# Patient Record
Sex: MALE | Race: WHITE | Employment: OTHER | URBAN - METROPOLITAN AREA
[De-identification: names, ages, dates, MRNs, and addresses within clinical notes are randomized per-mention and may not be internally consistent; named-entity substitution may affect disease eponyms.]

---

## 2018-03-02 PROBLEM — M48.061 LUMBAR STENOSIS: Status: ACTIVE | Noted: 2018-03-02

## 2018-05-24 PROBLEM — M54.16 LUMBAR RADICULAR PAIN: Status: ACTIVE | Noted: 2018-05-24

## 2020-02-13 ENCOUNTER — HOSPITAL ENCOUNTER (OUTPATIENT)
Dept: CT IMAGING | Age: 75
Discharge: HOME | End: 2020-02-13
Attending: ORTHOPAEDIC SURGERY
Payer: MEDICARE

## 2020-02-13 ENCOUNTER — HOSPITAL ENCOUNTER (OUTPATIENT)
Dept: GENERAL RADIOLOGY | Age: 75
Discharge: HOME | End: 2020-02-13
Attending: RADIOLOGY | Admitting: RADIOLOGY
Payer: MEDICARE

## 2020-02-13 VITALS
RESPIRATION RATE: 18 BRPM | DIASTOLIC BLOOD PRESSURE: 52 MMHG | WEIGHT: 139.8 LBS | HEIGHT: 67 IN | SYSTOLIC BLOOD PRESSURE: 139 MMHG | BODY MASS INDEX: 21.94 KG/M2 | TEMPERATURE: 98.1 F | OXYGEN SATURATION: 99 % | HEART RATE: 77 BPM

## 2020-02-13 DIAGNOSIS — M51.36 DEGENERATION OF LUMBAR INTERVERTEBRAL DISC: ICD-10-CM

## 2020-02-13 LAB
GLUCOSE BLD STRIP.AUTO-MCNC: 122 MG/DL (ref 70–110)
GLUCOSE BLD STRIP.AUTO-MCNC: 208 MG/DL (ref 70–110)

## 2020-02-13 PROCEDURE — 62305 MYELOGRAPHY LUMBAR INJECTION: CPT

## 2020-02-13 PROCEDURE — 74011250637 HC RX REV CODE- 250/637: Performed by: PHYSICIAN ASSISTANT

## 2020-02-13 PROCEDURE — 82962 GLUCOSE BLOOD TEST: CPT

## 2020-02-13 PROCEDURE — 74011636320 HC RX REV CODE- 636/320: Performed by: RADIOLOGY

## 2020-02-13 PROCEDURE — 72132 CT LUMBAR SPINE W/DYE: CPT

## 2020-02-13 PROCEDURE — 72129 CT CHEST SPINE W/DYE: CPT

## 2020-02-13 RX ORDER — HYDROCODONE BITARTRATE AND ACETAMINOPHEN 5; 325 MG/1; MG/1
1-2 TABLET ORAL
Status: DISCONTINUED | OUTPATIENT
Start: 2020-02-13 | End: 2020-02-13 | Stop reason: HOSPADM

## 2020-02-13 RX ORDER — PREDNISONE 5 MG/1
5 TABLET ORAL
COMMUNITY
End: 2020-03-20

## 2020-02-13 RX ORDER — ACETAMINOPHEN 325 MG/1
650 TABLET ORAL
Status: DISCONTINUED | OUTPATIENT
Start: 2020-02-13 | End: 2020-02-13 | Stop reason: HOSPADM

## 2020-02-13 RX ORDER — LIDOCAINE HYDROCHLORIDE 10 MG/ML
5 INJECTION, SOLUTION EPIDURAL; INFILTRATION; INTRACAUDAL; PERINEURAL
Status: COMPLETED | OUTPATIENT
Start: 2020-02-13 | End: 2020-02-13

## 2020-02-13 RX ADMIN — LIDOCAINE HYDROCHLORIDE 5 ML: 10 INJECTION, SOLUTION EPIDURAL; INFILTRATION; INTRACAUDAL; PERINEURAL at 14:23

## 2020-02-13 RX ADMIN — ACETAMINOPHEN 650 MG: 325 TABLET, FILM COATED ORAL at 14:48

## 2020-02-13 RX ADMIN — IOPAMIDOL 10 ML: 408 INJECTION, SOLUTION INTRATHECAL at 14:23

## 2020-02-13 NOTE — PERIOP NOTES
Patient's blood glucose was 208 by accucheck. Patient refused any insulin coverage. Will continue to monitor.

## 2020-02-13 NOTE — PROGRESS NOTES
The patient had a thoracic and lumbar myelogram with Giacomo GUY  pager number [phone]. Pt has a bandaid on his low back and current pain level is a 4/10. This was verbally handed off to BOZENA/Pete Vigil in [facility].

## 2020-02-13 NOTE — DISCHARGE INSTRUCTIONS
Patient armband removed and given to patient to take home. Patient was informed of the privacy risks if armband lost or stolen    Patient Education        Myelogram: About This Test  What is it? A myelogram uses X-rays and a special dye to make pictures of bones and nerves of the spine (spinal canal). The spinal canal holds the spinal cord, the spinal nerve roots, and the fluid-filled space between the bones in your spine. Why is this test done? A myelogram is done to check for:  · The cause of arm or leg numbness, weakness, or pain. · Narrowing of the spinal canal (spinal stenosis). · A tumor or infection causing problems with the spinal cord or nerve roots. · A spinal disc that has ruptured (herniated disc). · Inflammation of the membrane that covers the brain and spinal cord. · Problems with the blood vessels to the spine. How can you prepare for the test?  Your doctor will tell you if you need to change how much you eat and drink before the myelogram. You may be asked to increase the amount of water you drink before the test. Follow the instructions your doctor gives you about eating and drinking. Before a myelogram, tell your doctor if:  · You are allergic to any medicines, contrast material, or iodine dye. · You have had bleeding problems, or you take a blood thinner, such as aspirin, clopidogrel (Plavix), or warfarin (Coumadin), or you take over-the-counter medicines, such as ibuprofen (Advil, Motrin) or naproxen (Aleve). Your doctor will tell you when you should stop taking these medicines several days before your procedure. Make sure that you understand exactly what he or she wants you to do. · You have had kidney problems. · You have diabetes, especially if you take metformin (Glucophage, for example). · You are or might be pregnant. Ask someone to take you home and stay with you after the test.  What happens during the test?  The dye is put into your spinal canal with a thin needle.  This is called a lumbar puncture. The dye moves through the space so the nerve roots and spinal cord can be seen more clearly. After the dye is put in, you will lie still while the X-ray pictures are taken. How does it feel? You will feel a quick sting from a small needle that has medicine to numb the skin on your back. You will also feel some pressure as the long, thin spinal needle is put into your spinal canal. You may feel a quick sharp pain down your buttock or leg when the needle is moved in your spine. The dye may make you feel warm and flushed and have a metallic taste in your mouth. What else should you know about the test?  In rare cases, the hole made by the needle in the sac around the spine does not close normally. This can allow spinal fluid to leak out. This leak may need to be repaired through a procedure called an epidural blood patch. To do the patch, your doctor injects some of your own blood to cover the hole. How long does the test take? · A myelogram usually takes 30 minutes to 1 hour. What happens after the test?  · You will probably be able to go home 30 minutes to 2 hours after the test.  · You may need to lie in bed with your head raised for 4 to 24 hours after the test. To prevent seizures, which are a rare side effect, do not bend over or lie down with your head lower than your body. Keeping your head higher than your body after a myelogram also may help prevent or reduce other side effects, such as headache, nausea, or vomiting. · Avoid strenuous activity, such as running or heavy lifting, for at least 1 day after your myelogram.  · Drink plenty of water after the myelogram. Your doctor will give you instructions on taking your regular medicines. When should you call for help? Call 911 anytime you think you may need emergency care. For example, call if:  · You have a seizure.   Call your doctor now or seek immediate medical care if:  · You have any increase in pain, weakness, or numbness in your legs. · You have a severe headache or stiff neck, or your eyes become very sensitive to light. · You have a headache that lasts longer than 24 hours. · You have problems urinating or having a bowel movement. · You have a fever. Follow-up care is a key part of your treatment and safety. Be sure to make and go to all appointments, and call your doctor if you are having problems. It's also a good idea to keep a list of the medicines you take. Ask your doctor when you can expect to have your test results. Where can you learn more? Go to http://fartun-danya.info/. Enter I888 in the search box to learn more about \"Myelogram: About This Test.\"  Current as of: March 28, 2019  Content Version: 12.2  © 6023-3354 [employer]. Care instructions adapted under license by [employer] (which disclaims liability or warranty for this information). If you have questions about a medical condition or this instruction, always ask your healthcare professional. [address] any warranty or liability for your use of this information.

## 2020-02-13 NOTE — PERIOP NOTES
Pre-Op Summary    Pt arrived via car with family/friend and is oriented to time, place, person and situation. Patient with steady gait with cane assistive devices. Visit Vitals  /73 (BP 1 Location: Right arm, BP Patient Position: Sitting)   Pulse 77   Temp 98.1 °F (36.7 °C)   Resp 18   Ht 5' 7\" (1.702 m)   Wt 63.4 kg (139 lb 12.8 oz)   SpO2 96%   BMI 21.90 kg/m²         Patients belongings are locked up on [facility]. Patient's point of contact is his daughter Malgorzata Valdes and their contact number [phone]. They will be leaving and coming back. Please call when in recovery and let her know what time discharge is, and she will be back to pick pt up. They are able to receive medication information. They will be their ride home.

## 2020-02-13 NOTE — PERIOP NOTES
Patient arrived to phase 2 via stretcher. FSBG 122    Pain 6/10. Requests pain med fore relief during recovery. Tylenol or Norco offered. ...patient requests Tylenol only. 650 mg Tylenol administered at 1450. Pillow placed under legs for comfort. Band Aid to lower back is intact with scant dot of drainage. Myelogram recovery instructions reviewed with patient. He verbalized understanding. Beverage and snack provided.

## 2020-02-13 NOTE — PROCEDURES
Interventional Radiology Brief Procedure Note    Performed By: Javier Thornton PA-C    Attending Radiologist: Lillie Bolton MD    Pre-operative Diagnosis:  Thoracic back pain & low back pain    Post-operative Diagnosis: Same as pre-op diagnosis    Procedure(s) Performed:  Thoracic & Lumbar Myelogram    Anesthesia:  Local Anesthesia    Findings:  The patient's low back was prepped in the usual sterile manner. 1% Lidocaine was used for local anesthesia. A 22 G spinal needle was advanced into spinal subarachnoid space via a midline approach at L4.  15 cc of Isovue 200M was instilled into dural sac. Images and subsequent CT scan to follow. Complications: None immediate    Estimated Blood Loss:  Minimal    Tubes and Drains: None    Specimens: None    Condition: Good    Disposition:  Observation x 4 hours, then D/C home.     Javier Thornton PA-C  [facility]  Vascular & Interventional Radiology  2/13/2020

## 2020-03-20 ENCOUNTER — HOSPITAL ENCOUNTER (OUTPATIENT)
Dept: PREADMISSION TESTING | Age: 75
Discharge: HOME | End: 2020-03-20
Payer: MEDICARE

## 2020-03-20 ENCOUNTER — HOSPITAL ENCOUNTER (OUTPATIENT)
Dept: GENERAL RADIOLOGY | Age: 75
Discharge: HOME | End: 2020-03-20
Attending: ORTHOPAEDIC SURGERY
Payer: MEDICARE

## 2020-03-20 ENCOUNTER — HOSPITAL ENCOUNTER (OUTPATIENT)
Dept: LAB | Age: 75
Discharge: HOME | End: 2020-03-20
Payer: MEDICARE

## 2020-03-20 DIAGNOSIS — Z01.818 PRE-OP TESTING: ICD-10-CM

## 2020-03-20 DIAGNOSIS — M43.16 SPONDYLOLISTHESIS OF LUMBAR REGION: ICD-10-CM

## 2020-03-20 LAB
ALBUMIN SERPL-MCNC: 3.2 G/DL (ref 3.4–5)
ALBUMIN/GLOB SERPL: 1 {RATIO} (ref 0.8–1.7)
ALP SERPL-CCNC: 106 U/L (ref 45–117)
ALT SERPL-CCNC: 27 U/L (ref 16–61)
ANION GAP SERPL CALC-SCNC: 4 MMOL/L (ref 3–18)
APTT PPP: 25.4 SEC (ref 23–36.4)
AST SERPL-CCNC: 22 U/L (ref 10–38)
BILIRUB SERPL-MCNC: 0.4 MG/DL (ref 0.2–1)
BUN SERPL-MCNC: 16 MG/DL (ref 7–18)
BUN/CREAT SERPL: 19 (ref 12–20)
CALCIUM SERPL-MCNC: 9 MG/DL (ref 8.5–10.1)
CHLORIDE SERPL-SCNC: 104 MMOL/L (ref 100–111)
CO2 SERPL-SCNC: 31 MMOL/L (ref 21–32)
CREAT SERPL-MCNC: 0.85 MG/DL (ref 0.6–1.3)
ERYTHROCYTE [DISTWIDTH] IN BLOOD BY AUTOMATED COUNT: 13.1 % (ref 11.6–14.5)
GLOBULIN SER CALC-MCNC: 3.1 G/DL (ref 2–4)
GLUCOSE SERPL-MCNC: 207 MG/DL (ref 74–99)
HCT VFR BLD AUTO: 33.9 % (ref 36–48)
HGB BLD-MCNC: 11.1 G/DL (ref 13–16)
INR PPP: 1.1 (ref 0.8–1.2)
MCH RBC QN AUTO: 29.9 PG (ref 24–34)
MCHC RBC AUTO-ENTMCNC: 32.7 G/DL (ref 31–37)
MCV RBC AUTO: 91.4 FL (ref 74–97)
PLATELET # BLD AUTO: 283 K/UL (ref 135–420)
PMV BLD AUTO: 10.3 FL (ref 9.2–11.8)
POTASSIUM SERPL-SCNC: 4.1 MMOL/L (ref 3.5–5.5)
PROT SERPL-MCNC: 6.3 G/DL (ref 6.4–8.2)
PROTHROMBIN TIME: 14.2 SEC (ref 11.5–15.2)
RBC # BLD AUTO: 3.71 M/UL (ref 4.7–5.5)
SODIUM SERPL-SCNC: 139 MMOL/L (ref 136–145)
WBC # BLD AUTO: 5 K/UL (ref 4.6–13.2)

## 2020-03-20 PROCEDURE — 85730 THROMBOPLASTIN TIME PARTIAL: CPT

## 2020-03-20 PROCEDURE — 93005 ELECTROCARDIOGRAM TRACING: CPT

## 2020-03-20 PROCEDURE — 85027 COMPLETE CBC AUTOMATED: CPT

## 2020-03-20 PROCEDURE — 36415 COLL VENOUS BLD VENIPUNCTURE: CPT

## 2020-03-20 PROCEDURE — 85610 PROTHROMBIN TIME: CPT

## 2020-03-20 PROCEDURE — 71046 X-RAY EXAM CHEST 2 VIEWS: CPT

## 2020-03-20 PROCEDURE — 80053 COMPREHEN METABOLIC PANEL: CPT

## 2020-03-20 RX ORDER — PANTOPRAZOLE SODIUM 40 MG/1
40 TABLET, DELAYED RELEASE ORAL DAILY
COMMUNITY

## 2020-03-20 RX ORDER — GLIMEPIRIDE 1 MG/1
1 TABLET ORAL 2 TIMES DAILY
COMMUNITY

## 2020-03-20 NOTE — PERIOP NOTES
PAT - SURGICAL PRE-ADMISSION INSTRUCTIONS    NAME:  Mio Bean                                                          TODAY'S DATE:  3/20/2020    SURGERY DATE:  4/16/2020                                  SURGERY ARRIVAL TIME:   TBA    1. Do NOT eat or drink anything, including candy or gum, after MIDNIGHT on 04/15 , unless you have specific instructions from your Surgeon or Anesthesia Provider to do so. 2. No smoking on the day of surgery. 3. No alcohol 24 hours prior to the day of surgery. 4. No recreational drugs for one week prior to the day of surgery. 5. Leave all valuables, including money/purse, at home. 6. Remove all jewelry, nail polish, makeup (including mascara); no lotions, powders, deodorant, or perfume/cologne/after shave. 7. Glasses/Contact lenses and Dentures may be worn to the hospital.  They will be removed prior to surgery. 8. Call your doctor if symptoms of a cold or illness develop within 24 ours prior to surgery. 9. AN ADULT MUST DRIVE YOU HOME AFTER OUTPATIENT SURGERY. 10. If you are having an OUTPATIENT procedure, please make arrangements for a responsible adult to be with you for 24 hours after your surgery. 11. If you are admitted to the hospital, you will be assigned to a bed after surgery is complete. Normally a family member will not be able to see you until you are in your assigned bed. 15. Family is encouraged to accompany you to the hospital.  We ask visitors in the treatment area to be limited to ONE person at a time to ensure patient privacy. EXCEPTIONS WILL BE MADE AS NEEDED. 15. Children under 12 are discouraged from entering the treatment area and need to be supervised by an adult when in the waiting room. Special Instructions: Take these medications the morning of surgery with a sip of water:  NONE, HOLD oral diabetic medication on the MORNING OF surgery.     Patient Prep:    use CHG solution    These surgical instructions were reviewed with Aguilar Heredia during the PAT visit. Directions: On the morning of surgery, please go to the [address] [facility]. Enter the building from the [facility] entrance, 1st floor (next to the Emergency Room entrance). Take the elevator to the 2nd floor. Sign in at the Registration Desk.     If you have any questions and/or concerns, please do not hesitate to call:  (Prior to the day of surgery)  [facility] unit:  [phone]  (Day of surgery)  [facility] unit:  [phone]

## 2020-03-21 LAB
ATRIAL RATE: 101 BPM
CALCULATED P AXIS, ECG09: 81 DEGREES
CALCULATED R AXIS, ECG10: 81 DEGREES
CALCULATED T AXIS, ECG11: 49 DEGREES
DIAGNOSIS, 93000: NORMAL
P-R INTERVAL, ECG05: 150 MS
Q-T INTERVAL, ECG07: 366 MS
QRS DURATION, ECG06: 120 MS
QTC CALCULATION (BEZET), ECG08: 474 MS
VENTRICULAR RATE, ECG03: 101 BPM

## 2020-04-15 ENCOUNTER — ANESTHESIA EVENT (OUTPATIENT)
Dept: SURGERY | Age: 75
DRG: 456 | End: 2020-04-15
Payer: MEDICARE

## 2020-04-15 NOTE — PERIOP NOTES
Patient was called and notified of new surgery time of 0730. Notifed to be here at 0530 tomm morning. Patient stated that he understood, and no questions at this time.

## 2020-04-16 ENCOUNTER — APPOINTMENT (OUTPATIENT)
Dept: GENERAL RADIOLOGY | Age: 75
DRG: 456 | End: 2020-04-16
Attending: ORTHOPAEDIC SURGERY
Payer: MEDICARE

## 2020-04-16 ENCOUNTER — HOSPITAL ENCOUNTER (INPATIENT)
Age: 75
LOS: 5 days | Discharge: NURSING, REHAB OR LONG-TERM CARE | DRG: 456 | End: 2020-04-21
Attending: ORTHOPAEDIC SURGERY | Admitting: ORTHOPAEDIC SURGERY
Payer: MEDICARE

## 2020-04-16 ENCOUNTER — ANESTHESIA (OUTPATIENT)
Dept: SURGERY | Age: 75
DRG: 456 | End: 2020-04-16
Payer: MEDICARE

## 2020-04-16 DIAGNOSIS — Z98.1 S/P LUMBAR FUSION: Primary | ICD-10-CM

## 2020-04-16 LAB
ANION GAP SERPL CALC-SCNC: 9 MMOL/L (ref 3–18)
BASOPHILS # BLD: 0 K/UL (ref 0–0.1)
BASOPHILS NFR BLD: 0 % (ref 0–2)
BUN SERPL-MCNC: 10 MG/DL (ref 7–18)
BUN/CREAT SERPL: 13 (ref 12–20)
CALCIUM SERPL-MCNC: 7.6 MG/DL (ref 8.5–10.1)
CHLORIDE SERPL-SCNC: 109 MMOL/L (ref 100–111)
CO2 SERPL-SCNC: 23 MMOL/L (ref 21–32)
CREAT SERPL-MCNC: 0.75 MG/DL (ref 0.6–1.3)
DIFFERENTIAL METHOD BLD: ABNORMAL
EOSINOPHIL # BLD: 0 K/UL (ref 0–0.4)
EOSINOPHIL NFR BLD: 0 % (ref 0–5)
ERYTHROCYTE [DISTWIDTH] IN BLOOD BY AUTOMATED COUNT: 13.7 % (ref 11.6–14.5)
GLUCOSE BLD STRIP.AUTO-MCNC: 118 MG/DL (ref 70–110)
GLUCOSE BLD STRIP.AUTO-MCNC: 193 MG/DL (ref 70–110)
GLUCOSE BLD STRIP.AUTO-MCNC: 220 MG/DL (ref 70–110)
GLUCOSE BLD STRIP.AUTO-MCNC: 234 MG/DL (ref 70–110)
GLUCOSE BLD STRIP.AUTO-MCNC: 245 MG/DL (ref 70–110)
GLUCOSE SERPL-MCNC: 225 MG/DL (ref 74–99)
HBA1C MFR BLD: 6.2 % (ref 4.2–5.6)
HCT VFR BLD AUTO: 27.9 % (ref 36–48)
HCT VFR BLD AUTO: 30.5 % (ref 36–48)
HGB BLD-MCNC: 9.2 G/DL (ref 13–16)
HGB BLD-MCNC: 9.9 G/DL (ref 13–16)
LYMPHOCYTES # BLD: 0.4 K/UL (ref 0.9–3.6)
LYMPHOCYTES NFR BLD: 4 % (ref 21–52)
MCH RBC QN AUTO: 30 PG (ref 24–34)
MCHC RBC AUTO-ENTMCNC: 32.5 G/DL (ref 31–37)
MCV RBC AUTO: 92.4 FL (ref 74–97)
MONOCYTES # BLD: 0.3 K/UL (ref 0.05–1.2)
MONOCYTES NFR BLD: 3 % (ref 3–10)
NEUTS SEG # BLD: 9.1 K/UL (ref 1.8–8)
NEUTS SEG NFR BLD: 93 % (ref 40–73)
PLATELET # BLD AUTO: 217 K/UL (ref 135–420)
PMV BLD AUTO: 10.3 FL (ref 9.2–11.8)
POTASSIUM SERPL-SCNC: 4.2 MMOL/L (ref 3.5–5.5)
RBC # BLD AUTO: 3.3 M/UL (ref 4.7–5.5)
SODIUM SERPL-SCNC: 141 MMOL/L (ref 136–145)
TSH SERPL DL<=0.05 MIU/L-ACNC: 1.03 UIU/ML (ref 0.36–3.74)
WBC # BLD AUTO: 9.8 K/UL (ref 4.6–13.2)

## 2020-04-16 PROCEDURE — 74011250636 HC RX REV CODE- 250/636: Performed by: NURSE ANESTHETIST, CERTIFIED REGISTERED

## 2020-04-16 PROCEDURE — 0QH204Z INSERTION OF INTERNAL FIXATION DEVICE INTO RIGHT PELVIC BONE, OPEN APPROACH: ICD-10-PCS | Performed by: ORTHOPAEDIC SURGERY

## 2020-04-16 PROCEDURE — 77030018673: Performed by: ORTHOPAEDIC SURGERY

## 2020-04-16 PROCEDURE — 77030036944: Performed by: ORTHOPAEDIC SURGERY

## 2020-04-16 PROCEDURE — 77030011264 HC ELECTRD BLD EXT COVD -A: Performed by: ORTHOPAEDIC SURGERY

## 2020-04-16 PROCEDURE — 0RG7071 FUSION OF 2 TO 7 THORACIC VERTEBRAL JOINTS WITH AUTOLOGOUS TISSUE SUBSTITUTE, POSTERIOR APPROACH, POSTERIOR COLUMN, OPEN APPROACH: ICD-10-PCS | Performed by: ORTHOPAEDIC SURGERY

## 2020-04-16 PROCEDURE — 0RGA071 FUSION OF THORACOLUMBAR VERTEBRAL JOINT WITH AUTOLOGOUS TISSUE SUBSTITUTE, POSTERIOR APPROACH, POSTERIOR COLUMN, OPEN APPROACH: ICD-10-PCS | Performed by: ORTHOPAEDIC SURGERY

## 2020-04-16 PROCEDURE — 0SG3071 FUSION OF LUMBOSACRAL JOINT WITH AUTOLOGOUS TISSUE SUBSTITUTE, POSTERIOR APPROACH, POSTERIOR COLUMN, OPEN APPROACH: ICD-10-PCS | Performed by: ORTHOPAEDIC SURGERY

## 2020-04-16 PROCEDURE — 77030034403 HC GRFT DBM PTTY OSTEOSLCT 10CC SYR BACT -G: Performed by: ORTHOPAEDIC SURGERY

## 2020-04-16 PROCEDURE — 74011000272 HC RX REV CODE- 272: Performed by: ORTHOPAEDIC SURGERY

## 2020-04-16 PROCEDURE — 74011000250 HC RX REV CODE- 250: Performed by: ORTHOPAEDIC SURGERY

## 2020-04-16 PROCEDURE — C1713 ANCHOR/SCREW BN/BN,TIS/BN: HCPCS | Performed by: ORTHOPAEDIC SURGERY

## 2020-04-16 PROCEDURE — 0Q800ZZ DIVISION OF LUMBAR VERTEBRA, OPEN APPROACH: ICD-10-PCS | Performed by: ORTHOPAEDIC SURGERY

## 2020-04-16 PROCEDURE — 86900 BLOOD TYPING SEROLOGIC ABO: CPT

## 2020-04-16 PROCEDURE — 77030040356 HC CORD BPLR FRCP COVD -A: Performed by: ORTHOPAEDIC SURGERY

## 2020-04-16 PROCEDURE — 77030020269 HC MISC IMPL: Performed by: ORTHOPAEDIC SURGERY

## 2020-04-16 PROCEDURE — 77030018723 HC ELCTRD BLD COVD -A: Performed by: ORTHOPAEDIC SURGERY

## 2020-04-16 PROCEDURE — 0SG1071 FUSION OF 2 OR MORE LUMBAR VERTEBRAL JOINTS WITH AUTOLOGOUS TISSUE SUBSTITUTE, POSTERIOR APPROACH, POSTERIOR COLUMN, OPEN APPROACH: ICD-10-PCS | Performed by: ORTHOPAEDIC SURGERY

## 2020-04-16 PROCEDURE — 74011250637 HC RX REV CODE- 250/637: Performed by: INTERNAL MEDICINE

## 2020-04-16 PROCEDURE — 74011250636 HC RX REV CODE- 250/636: Performed by: ORTHOPAEDIC SURGERY

## 2020-04-16 PROCEDURE — 85025 COMPLETE CBC W/AUTO DIFF WBC: CPT

## 2020-04-16 PROCEDURE — 77030004391 HC BUR FLUT MEDT -C: Performed by: ORTHOPAEDIC SURGERY

## 2020-04-16 PROCEDURE — 76010000183 HC OR TIME 8 TO 8.5 HR INTENSV-TIER 1: Performed by: ORTHOPAEDIC SURGERY

## 2020-04-16 PROCEDURE — 83036 HEMOGLOBIN GLYCOSYLATED A1C: CPT

## 2020-04-16 PROCEDURE — 77030028270 HC SRGFL HEMSTAT MTRX J&J -C: Performed by: ORTHOPAEDIC SURGERY

## 2020-04-16 PROCEDURE — 77030018836 HC SOL IRR NACL ICUM -A: Performed by: ORTHOPAEDIC SURGERY

## 2020-04-16 PROCEDURE — 77030034850: Performed by: ORTHOPAEDIC SURGERY

## 2020-04-16 PROCEDURE — 77030040413: Performed by: ORTHOPAEDIC SURGERY

## 2020-04-16 PROCEDURE — 74011000250 HC RX REV CODE- 250: Performed by: NURSE ANESTHETIST, CERTIFIED REGISTERED

## 2020-04-16 PROCEDURE — 74011636637 HC RX REV CODE- 636/637: Performed by: INTERNAL MEDICINE

## 2020-04-16 PROCEDURE — 65270000029 HC RM PRIVATE

## 2020-04-16 PROCEDURE — 77030031139 HC SUT VCRL2 J&J -A: Performed by: ORTHOPAEDIC SURGERY

## 2020-04-16 PROCEDURE — 77030003196 HC GRFT RECOMB BN MEDT -K1: Performed by: ORTHOPAEDIC SURGERY

## 2020-04-16 PROCEDURE — 77030013079 HC BLNKT BAIR HGGR 3M -A: Performed by: NURSE ANESTHETIST, CERTIFIED REGISTERED

## 2020-04-16 PROCEDURE — 77030031359 HC BLD BN MILL DISP STRY -E: Performed by: ORTHOPAEDIC SURGERY

## 2020-04-16 PROCEDURE — 3E0U0GB INTRODUCTION OF RECOMBINANT BONE MORPHOGENETIC PROTEIN INTO JOINTS, OPEN APPROACH: ICD-10-PCS | Performed by: ORTHOPAEDIC SURGERY

## 2020-04-16 PROCEDURE — 74011250636 HC RX REV CODE- 250/636

## 2020-04-16 PROCEDURE — 85018 HEMOGLOBIN: CPT

## 2020-04-16 PROCEDURE — 77030008683 HC TU ET CUF COVD -A: Performed by: NURSE ANESTHETIST, CERTIFIED REGISTERED

## 2020-04-16 PROCEDURE — 77030037087 HC DEV ART BMC PRP CLNG -H: Performed by: ORTHOPAEDIC SURGERY

## 2020-04-16 PROCEDURE — 84443 ASSAY THYROID STIM HORMONE: CPT

## 2020-04-16 PROCEDURE — 0SP004Z REMOVAL OF INTERNAL FIXATION DEVICE FROM LUMBAR VERTEBRAL JOINT, OPEN APPROACH: ICD-10-PCS | Performed by: ORTHOPAEDIC SURGERY

## 2020-04-16 PROCEDURE — 72100 X-RAY EXAM L-S SPINE 2/3 VWS: CPT

## 2020-04-16 PROCEDURE — 77030021678 HC GLIDESCP STAT DISP VERT -B: Performed by: NURSE ANESTHETIST, CERTIFIED REGISTERED

## 2020-04-16 PROCEDURE — 74011636637 HC RX REV CODE- 636/637: Performed by: NURSE ANESTHETIST, CERTIFIED REGISTERED

## 2020-04-16 PROCEDURE — 77030003028 HC SUT VCRL J&J -A: Performed by: ORTHOPAEDIC SURGERY

## 2020-04-16 PROCEDURE — 82962 GLUCOSE BLOOD TEST: CPT

## 2020-04-16 PROCEDURE — 76210000001 HC OR PH I REC 2.5 TO 3 HR: Performed by: ORTHOPAEDIC SURGERY

## 2020-04-16 PROCEDURE — 74011000258 HC RX REV CODE- 258: Performed by: NURSE ANESTHETIST, CERTIFIED REGISTERED

## 2020-04-16 PROCEDURE — 76060000047 HC ANESTHESIA 8 TO 8.5 HR: Performed by: ORTHOPAEDIC SURGERY

## 2020-04-16 PROCEDURE — 0QH304Z INSERTION OF INTERNAL FIXATION DEVICE INTO LEFT PELVIC BONE, OPEN APPROACH: ICD-10-PCS | Performed by: ORTHOPAEDIC SURGERY

## 2020-04-16 PROCEDURE — 86920 COMPATIBILITY TEST SPIN: CPT

## 2020-04-16 PROCEDURE — 77030036946 HC GRFT BN FBR CORT 3DEMIN 10CC BACT -G: Performed by: ORTHOPAEDIC SURGERY

## 2020-04-16 PROCEDURE — 01NB0ZZ RELEASE LUMBAR NERVE, OPEN APPROACH: ICD-10-PCS | Performed by: ORTHOPAEDIC SURGERY

## 2020-04-16 PROCEDURE — 77030032490 HC SLV COMPR SCD KNE COVD -B: Performed by: ORTHOPAEDIC SURGERY

## 2020-04-16 PROCEDURE — 74011250636 HC RX REV CODE- 250/636: Performed by: INTERNAL MEDICINE

## 2020-04-16 PROCEDURE — 74011250637 HC RX REV CODE- 250/637: Performed by: NURSE ANESTHETIST, CERTIFIED REGISTERED

## 2020-04-16 PROCEDURE — 80048 BASIC METABOLIC PNL TOTAL CA: CPT

## 2020-04-16 DEVICE — SCREW POLYAX ASMBLY 6.5MMX45MM: Type: IMPLANTABLE DEVICE | Site: SPINE LUMBAR | Status: FUNCTIONAL

## 2020-04-16 DEVICE — IMPLANTABLE DEVICE: Type: IMPLANTABLE DEVICE | Site: SPINE LUMBAR | Status: FUNCTIONAL

## 2020-04-16 DEVICE — SCREW SPNL POST THORACOLUMBOSACRAL LCK CLOSE TULIP RELINE: Type: IMPLANTABLE DEVICE | Site: SPINE LUMBAR | Status: FUNCTIONAL

## 2020-04-16 DEVICE — SCREW POLYAX ASMBLY 8.5MMX80MM: Type: IMPLANTABLE DEVICE | Site: SPINE LUMBAR | Status: FUNCTIONAL

## 2020-04-16 DEVICE — SCREW SPNL DIA5.5MM OPN TULIP LOK RELINE: Type: IMPLANTABLE DEVICE | Site: SPINE LUMBAR | Status: FUNCTIONAL

## 2020-04-16 DEVICE — BONE GRAFT KIT 7510800 INFUSE LARGE II
Type: IMPLANTABLE DEVICE | Site: SPINE LUMBAR | Status: FUNCTIONAL
Brand: INFUSE® BONE GRAFT

## 2020-04-16 DEVICE — SCREW SPNL L50MM OD65MM POLYAX ASSEMB CANAVERAL: Type: IMPLANTABLE DEVICE | Site: SPINE LUMBAR | Status: FUNCTIONAL

## 2020-04-16 DEVICE — SCREW SPNL L40MM OD6.5MM POLYAX ASMBLY CANAVERAL: Type: IMPLANTABLE DEVICE | Site: SPINE LUMBAR | Status: FUNCTIONAL

## 2020-04-16 RX ORDER — NALOXONE HYDROCHLORIDE 0.4 MG/ML
INJECTION, SOLUTION INTRAMUSCULAR; INTRAVENOUS; SUBCUTANEOUS
Status: COMPLETED
Start: 2020-04-16 | End: 2020-04-16

## 2020-04-16 RX ORDER — INSULIN LISPRO 100 [IU]/ML
INJECTION, SOLUTION INTRAVENOUS; SUBCUTANEOUS
Status: DISCONTINUED | OUTPATIENT
Start: 2020-04-17 | End: 2020-04-16

## 2020-04-16 RX ORDER — LABETALOL HYDROCHLORIDE 5 MG/ML
5 INJECTION, SOLUTION INTRAVENOUS AS NEEDED
Status: DISCONTINUED | OUTPATIENT
Start: 2020-04-16 | End: 2020-04-16

## 2020-04-16 RX ORDER — SODIUM CHLORIDE 9 MG/ML
250 INJECTION, SOLUTION INTRAVENOUS AS NEEDED
Status: DISCONTINUED | OUTPATIENT
Start: 2020-04-16 | End: 2020-04-18

## 2020-04-16 RX ORDER — DIPHENHYDRAMINE HYDROCHLORIDE 50 MG/ML
12.5 INJECTION, SOLUTION INTRAMUSCULAR; INTRAVENOUS
Status: DISCONTINUED | OUTPATIENT
Start: 2020-04-16 | End: 2020-04-16

## 2020-04-16 RX ORDER — OXYCODONE AND ACETAMINOPHEN 10; 325 MG/1; MG/1
2 TABLET ORAL
Status: DISCONTINUED | OUTPATIENT
Start: 2020-04-16 | End: 2020-04-16

## 2020-04-16 RX ORDER — MAGNESIUM SULFATE 100 %
4 CRYSTALS MISCELLANEOUS AS NEEDED
Status: DISCONTINUED | OUTPATIENT
Start: 2020-04-16 | End: 2020-04-21 | Stop reason: HOSPADM

## 2020-04-16 RX ORDER — INSULIN LISPRO 100 [IU]/ML
INJECTION, SOLUTION INTRAVENOUS; SUBCUTANEOUS ONCE
Status: COMPLETED | OUTPATIENT
Start: 2020-04-16 | End: 2020-04-16

## 2020-04-16 RX ORDER — SUCCINYLCHOLINE CHLORIDE 20 MG/ML
INJECTION INTRAMUSCULAR; INTRAVENOUS AS NEEDED
Status: DISCONTINUED | OUTPATIENT
Start: 2020-04-16 | End: 2020-04-16 | Stop reason: HOSPADM

## 2020-04-16 RX ORDER — DEXAMETHASONE SODIUM PHOSPHATE 4 MG/ML
INJECTION, SOLUTION INTRA-ARTICULAR; INTRALESIONAL; INTRAMUSCULAR; INTRAVENOUS; SOFT TISSUE AS NEEDED
Status: DISCONTINUED | OUTPATIENT
Start: 2020-04-16 | End: 2020-04-16 | Stop reason: HOSPADM

## 2020-04-16 RX ORDER — SODIUM CHLORIDE 0.9 % (FLUSH) 0.9 %
5-40 SYRINGE (ML) INJECTION EVERY 8 HOURS
Status: DISCONTINUED | OUTPATIENT
Start: 2020-04-16 | End: 2020-04-21 | Stop reason: HOSPADM

## 2020-04-16 RX ORDER — SODIUM CHLORIDE, SODIUM LACTATE, POTASSIUM CHLORIDE, CALCIUM CHLORIDE 600; 310; 30; 20 MG/100ML; MG/100ML; MG/100ML; MG/100ML
25 INJECTION, SOLUTION INTRAVENOUS CONTINUOUS
Status: DISCONTINUED | OUTPATIENT
Start: 2020-04-16 | End: 2020-04-16 | Stop reason: HOSPADM

## 2020-04-16 RX ORDER — SODIUM CHLORIDE AND POTASSIUM CHLORIDE .9; .15 G/100ML; G/100ML
1000 SOLUTION INTRAVENOUS CONTINUOUS
Status: DISCONTINUED | OUTPATIENT
Start: 2020-04-16 | End: 2020-04-16

## 2020-04-16 RX ORDER — PHENYLEPHRINE HCL IN 0.9% NACL 30MG/250ML
PLASTIC BAG, INJECTION (ML) INTRAVENOUS
Status: DISCONTINUED | OUTPATIENT
Start: 2020-04-16 | End: 2020-04-16 | Stop reason: HOSPADM

## 2020-04-16 RX ORDER — SODIUM CHLORIDE AND POTASSIUM CHLORIDE .9; .15 G/100ML; G/100ML
SOLUTION INTRAVENOUS CONTINUOUS
Status: DISCONTINUED | OUTPATIENT
Start: 2020-04-16 | End: 2020-04-20

## 2020-04-16 RX ORDER — CEFAZOLIN SODIUM 2 G/50ML
2 SOLUTION INTRAVENOUS EVERY 8 HOURS
Status: DISCONTINUED | OUTPATIENT
Start: 2020-04-16 | End: 2020-04-17

## 2020-04-16 RX ORDER — NALOXONE HYDROCHLORIDE 0.4 MG/ML
0.04 INJECTION, SOLUTION INTRAMUSCULAR; INTRAVENOUS; SUBCUTANEOUS AS NEEDED
Status: DISCONTINUED | OUTPATIENT
Start: 2020-04-16 | End: 2020-04-16

## 2020-04-16 RX ORDER — HYDROMORPHONE HYDROCHLORIDE 1 MG/ML
1 INJECTION, SOLUTION INTRAMUSCULAR; INTRAVENOUS; SUBCUTANEOUS
Status: DISCONTINUED | OUTPATIENT
Start: 2020-04-16 | End: 2020-04-21 | Stop reason: HOSPADM

## 2020-04-16 RX ORDER — FENTANYL CITRATE 50 UG/ML
INJECTION, SOLUTION INTRAMUSCULAR; INTRAVENOUS AS NEEDED
Status: DISCONTINUED | OUTPATIENT
Start: 2020-04-16 | End: 2020-04-16 | Stop reason: HOSPADM

## 2020-04-16 RX ORDER — SODIUM CHLORIDE, SODIUM LACTATE, POTASSIUM CHLORIDE, CALCIUM CHLORIDE 600; 310; 30; 20 MG/100ML; MG/100ML; MG/100ML; MG/100ML
50 INJECTION, SOLUTION INTRAVENOUS CONTINUOUS
Status: DISCONTINUED | OUTPATIENT
Start: 2020-04-16 | End: 2020-04-16

## 2020-04-16 RX ORDER — INSULIN LISPRO 100 [IU]/ML
INJECTION, SOLUTION INTRAVENOUS; SUBCUTANEOUS ONCE
Status: DISCONTINUED | OUTPATIENT
Start: 2020-04-16 | End: 2020-04-16 | Stop reason: HOSPADM

## 2020-04-16 RX ORDER — VANCOMYCIN HYDROCHLORIDE 1 G/20ML
INJECTION, POWDER, LYOPHILIZED, FOR SOLUTION INTRAVENOUS AS NEEDED
Status: DISCONTINUED | OUTPATIENT
Start: 2020-04-16 | End: 2020-04-16 | Stop reason: HOSPADM

## 2020-04-16 RX ORDER — INSULIN LISPRO 100 [IU]/ML
4 INJECTION, SOLUTION INTRAVENOUS; SUBCUTANEOUS ONCE
Status: COMPLETED | OUTPATIENT
Start: 2020-04-16 | End: 2020-04-16

## 2020-04-16 RX ORDER — PROPOFOL 10 MG/ML
INJECTION, EMULSION INTRAVENOUS AS NEEDED
Status: DISCONTINUED | OUTPATIENT
Start: 2020-04-16 | End: 2020-04-16 | Stop reason: HOSPADM

## 2020-04-16 RX ORDER — HYDROMORPHONE HYDROCHLORIDE 1 MG/ML
INJECTION, SOLUTION INTRAMUSCULAR; INTRAVENOUS; SUBCUTANEOUS AS NEEDED
Status: DISCONTINUED | OUTPATIENT
Start: 2020-04-16 | End: 2020-04-16 | Stop reason: HOSPADM

## 2020-04-16 RX ORDER — OXYCODONE AND ACETAMINOPHEN 10; 325 MG/1; MG/1
1-2 TABLET ORAL
Status: DISCONTINUED | OUTPATIENT
Start: 2020-04-16 | End: 2020-04-21 | Stop reason: HOSPADM

## 2020-04-16 RX ORDER — HYDRALAZINE HYDROCHLORIDE 20 MG/ML
20 INJECTION INTRAMUSCULAR; INTRAVENOUS
Status: DISCONTINUED | OUTPATIENT
Start: 2020-04-16 | End: 2020-04-16 | Stop reason: HOSPADM

## 2020-04-16 RX ORDER — INSULIN LISPRO 100 [IU]/ML
INJECTION, SOLUTION INTRAVENOUS; SUBCUTANEOUS
Status: DISCONTINUED | OUTPATIENT
Start: 2020-04-17 | End: 2020-04-17

## 2020-04-16 RX ORDER — SODIUM CHLORIDE 9 MG/ML
INJECTION, SOLUTION INTRAVENOUS
Status: DISCONTINUED | OUTPATIENT
Start: 2020-04-16 | End: 2020-04-16 | Stop reason: HOSPADM

## 2020-04-16 RX ORDER — DEXTROSE MONOHYDRATE 100 MG/ML
125-250 INJECTION, SOLUTION INTRAVENOUS AS NEEDED
Status: DISCONTINUED | OUTPATIENT
Start: 2020-04-16 | End: 2020-04-16

## 2020-04-16 RX ORDER — CEFAZOLIN SODIUM 2 G/50ML
2 SOLUTION INTRAVENOUS EVERY 8 HOURS
Status: DISCONTINUED | OUTPATIENT
Start: 2020-04-16 | End: 2020-04-16

## 2020-04-16 RX ORDER — MIDAZOLAM HYDROCHLORIDE 1 MG/ML
INJECTION, SOLUTION INTRAMUSCULAR; INTRAVENOUS AS NEEDED
Status: DISCONTINUED | OUTPATIENT
Start: 2020-04-16 | End: 2020-04-16 | Stop reason: HOSPADM

## 2020-04-16 RX ORDER — MAGNESIUM SULFATE 100 %
4 CRYSTALS MISCELLANEOUS AS NEEDED
Status: DISCONTINUED | OUTPATIENT
Start: 2020-04-16 | End: 2020-04-16

## 2020-04-16 RX ORDER — FENTANYL CITRATE 50 UG/ML
50 INJECTION, SOLUTION INTRAMUSCULAR; INTRAVENOUS
Status: DISCONTINUED | OUTPATIENT
Start: 2020-04-16 | End: 2020-04-16

## 2020-04-16 RX ORDER — ONDANSETRON 2 MG/ML
INJECTION INTRAMUSCULAR; INTRAVENOUS AS NEEDED
Status: DISCONTINUED | OUTPATIENT
Start: 2020-04-16 | End: 2020-04-16 | Stop reason: HOSPADM

## 2020-04-16 RX ORDER — FAMOTIDINE 20 MG/1
20 TABLET, FILM COATED ORAL ONCE
Status: COMPLETED | OUTPATIENT
Start: 2020-04-16 | End: 2020-04-16

## 2020-04-16 RX ORDER — PROPOFOL 10 MG/ML
VIAL (ML) INTRAVENOUS
Status: DISCONTINUED | OUTPATIENT
Start: 2020-04-16 | End: 2020-04-16 | Stop reason: HOSPADM

## 2020-04-16 RX ORDER — ONDANSETRON 2 MG/ML
4 INJECTION INTRAMUSCULAR; INTRAVENOUS
Status: DISCONTINUED | OUTPATIENT
Start: 2020-04-16 | End: 2020-04-21 | Stop reason: HOSPADM

## 2020-04-16 RX ORDER — ALBUTEROL SULFATE 0.83 MG/ML
2.5 SOLUTION RESPIRATORY (INHALATION) AS NEEDED
Status: DISCONTINUED | OUTPATIENT
Start: 2020-04-16 | End: 2020-04-16

## 2020-04-16 RX ORDER — NALOXONE HYDROCHLORIDE 0.4 MG/ML
0.4 INJECTION, SOLUTION INTRAMUSCULAR; INTRAVENOUS; SUBCUTANEOUS AS NEEDED
Status: DISCONTINUED | OUTPATIENT
Start: 2020-04-16 | End: 2020-04-21 | Stop reason: HOSPADM

## 2020-04-16 RX ORDER — ACETAMINOPHEN 500 MG
500 TABLET ORAL
Status: DISCONTINUED | OUTPATIENT
Start: 2020-04-16 | End: 2020-04-21 | Stop reason: HOSPADM

## 2020-04-16 RX ORDER — REMIFENTANIL HYDROCHLORIDE 1 MG/ML
INJECTION, POWDER, LYOPHILIZED, FOR SOLUTION INTRAVENOUS
Status: DISCONTINUED | OUTPATIENT
Start: 2020-04-16 | End: 2020-04-16 | Stop reason: HOSPADM

## 2020-04-16 RX ORDER — CEFAZOLIN SODIUM 2 G/50ML
2 SOLUTION INTRAVENOUS
Status: COMPLETED | OUTPATIENT
Start: 2020-04-16 | End: 2020-04-16

## 2020-04-16 RX ORDER — LIDOCAINE HYDROCHLORIDE 20 MG/ML
INJECTION, SOLUTION EPIDURAL; INFILTRATION; INTRACAUDAL; PERINEURAL AS NEEDED
Status: DISCONTINUED | OUTPATIENT
Start: 2020-04-16 | End: 2020-04-16 | Stop reason: HOSPADM

## 2020-04-16 RX ORDER — HYDROMORPHONE HYDROCHLORIDE 1 MG/ML
2 INJECTION, SOLUTION INTRAMUSCULAR; INTRAVENOUS; SUBCUTANEOUS
Status: DISCONTINUED | OUTPATIENT
Start: 2020-04-16 | End: 2020-04-16

## 2020-04-16 RX ORDER — DIPHENHYDRAMINE HCL 25 MG
25 CAPSULE ORAL
Status: DISCONTINUED | OUTPATIENT
Start: 2020-04-16 | End: 2020-04-21 | Stop reason: HOSPADM

## 2020-04-16 RX ORDER — GLYCOPYRROLATE 0.2 MG/ML
INJECTION INTRAMUSCULAR; INTRAVENOUS AS NEEDED
Status: DISCONTINUED | OUTPATIENT
Start: 2020-04-16 | End: 2020-04-16 | Stop reason: HOSPADM

## 2020-04-16 RX ORDER — HYDROMORPHONE HYDROCHLORIDE 1 MG/ML
0.5 INJECTION, SOLUTION INTRAMUSCULAR; INTRAVENOUS; SUBCUTANEOUS AS NEEDED
Status: DISCONTINUED | OUTPATIENT
Start: 2020-04-16 | End: 2020-04-16

## 2020-04-16 RX ORDER — SODIUM CHLORIDE 0.9 % (FLUSH) 0.9 %
5-40 SYRINGE (ML) INJECTION AS NEEDED
Status: DISCONTINUED | OUTPATIENT
Start: 2020-04-16 | End: 2020-04-21 | Stop reason: HOSPADM

## 2020-04-16 RX ADMIN — FENTANYL CITRATE 100 MCG: 50 INJECTION, SOLUTION INTRAMUSCULAR; INTRAVENOUS at 07:45

## 2020-04-16 RX ADMIN — PHENYLEPHRINE HYDROCHLORIDE 100 MCG: 10 INJECTION INTRAVENOUS at 08:06

## 2020-04-16 RX ADMIN — CEFAZOLIN 2 G: 10 INJECTION, POWDER, FOR SOLUTION INTRAVENOUS at 21:41

## 2020-04-16 RX ADMIN — PROPOFOL 100 MCG/KG/MIN: 10 INJECTION, EMULSION INTRAVENOUS at 09:00

## 2020-04-16 RX ADMIN — SODIUM CHLORIDE, SODIUM LACTATE, POTASSIUM CHLORIDE, AND CALCIUM CHLORIDE: 600; 310; 30; 20 INJECTION, SOLUTION INTRAVENOUS at 09:00

## 2020-04-16 RX ADMIN — TRANEXAMIC ACID 0.68 G: 100 INJECTION, SOLUTION INTRAVENOUS at 09:51

## 2020-04-16 RX ADMIN — SUCCINYLCHOLINE CHLORIDE 100 MG: 20 INJECTION, SOLUTION INTRAMUSCULAR; INTRAVENOUS at 07:45

## 2020-04-16 RX ADMIN — Medication 40 MCG/MIN: at 09:29

## 2020-04-16 RX ADMIN — FENTANYL CITRATE 50 MCG: 50 INJECTION, SOLUTION INTRAMUSCULAR; INTRAVENOUS at 09:01

## 2020-04-16 RX ADMIN — FAMOTIDINE 20 MG: 20 TABLET ORAL at 06:32

## 2020-04-16 RX ADMIN — REMIFENTANIL HYDROCHLORIDE 0.05 MCG/KG/MIN: 1 INJECTION, POWDER, LYOPHILIZED, FOR SOLUTION INTRAVENOUS at 09:00

## 2020-04-16 RX ADMIN — SODIUM CHLORIDE: 900 INJECTION, SOLUTION INTRAVENOUS at 15:00

## 2020-04-16 RX ADMIN — REMIFENTANIL HYDROCHLORIDE: 1 INJECTION, POWDER, LYOPHILIZED, FOR SOLUTION INTRAVENOUS at 11:45

## 2020-04-16 RX ADMIN — Medication 40 MCG/MIN: at 09:30

## 2020-04-16 RX ADMIN — FENTANYL CITRATE 50 MCG: 50 INJECTION, SOLUTION INTRAMUSCULAR; INTRAVENOUS at 13:30

## 2020-04-16 RX ADMIN — MIDAZOLAM 1 MG: 1 INJECTION INTRAMUSCULAR; INTRAVENOUS at 07:35

## 2020-04-16 RX ADMIN — SODIUM CHLORIDE: 900 INJECTION, SOLUTION INTRAVENOUS at 09:00

## 2020-04-16 RX ADMIN — PROPOFOL 50 MG: 10 INJECTION, EMULSION INTRAVENOUS at 11:28

## 2020-04-16 RX ADMIN — PROPOFOL 150 MG: 10 INJECTION, EMULSION INTRAVENOUS at 07:45

## 2020-04-16 RX ADMIN — OXYCODONE HYDROCHLORIDE AND ACETAMINOPHEN 2 TABLET: 10; 325 TABLET ORAL at 20:43

## 2020-04-16 RX ADMIN — SODIUM CHLORIDE AND POTASSIUM CHLORIDE: 9; 1.49 INJECTION, SOLUTION INTRAVENOUS at 21:32

## 2020-04-16 RX ADMIN — LIDOCAINE HYDROCHLORIDE 60 MG: 20 INJECTION, SOLUTION INTRAVENOUS at 07:45

## 2020-04-16 RX ADMIN — NALOXONE HYDROCHLORIDE 0.4 MG: 0.4 INJECTION, SOLUTION INTRAMUSCULAR; INTRAVENOUS; SUBCUTANEOUS at 16:55

## 2020-04-16 RX ADMIN — INSULIN LISPRO 6 UNITS: 100 INJECTION, SOLUTION INTRAVENOUS; SUBCUTANEOUS at 16:14

## 2020-04-16 RX ADMIN — SODIUM CHLORIDE, SODIUM LACTATE, POTASSIUM CHLORIDE, AND CALCIUM CHLORIDE: 600; 310; 30; 20 INJECTION, SOLUTION INTRAVENOUS at 07:30

## 2020-04-16 RX ADMIN — FENTANYL CITRATE 50 MCG: 50 INJECTION, SOLUTION INTRAMUSCULAR; INTRAVENOUS at 11:28

## 2020-04-16 RX ADMIN — INSULIN LISPRO 4 UNITS: 100 INJECTION, SOLUTION INTRAVENOUS; SUBCUTANEOUS at 22:34

## 2020-04-16 RX ADMIN — NALOXONE HYDROCHLORIDE 0.4 MG: 0.4 INJECTION, SOLUTION INTRAMUSCULAR; INTRAVENOUS; SUBCUTANEOUS at 17:04

## 2020-04-16 RX ADMIN — DEXAMETHASONE SODIUM PHOSPHATE 4 MG: 4 INJECTION, SOLUTION INTRA-ARTICULAR; INTRALESIONAL; INTRAMUSCULAR; INTRAVENOUS; SOFT TISSUE at 07:45

## 2020-04-16 RX ADMIN — FENTANYL CITRATE 50 MCG: 0.05 INJECTION, SOLUTION INTRAMUSCULAR; INTRAVENOUS at 16:11

## 2020-04-16 RX ADMIN — SODIUM CHLORIDE, SODIUM LACTATE, POTASSIUM CHLORIDE, AND CALCIUM CHLORIDE 25 ML/HR: 600; 310; 30; 20 INJECTION, SOLUTION INTRAVENOUS at 07:00

## 2020-04-16 RX ADMIN — CEFAZOLIN SODIUM 2 G: 2 SOLUTION INTRAVENOUS at 07:30

## 2020-04-16 RX ADMIN — CEFAZOLIN SODIUM 2 G: 2 SOLUTION INTRAVENOUS at 12:50

## 2020-04-16 RX ADMIN — HYDROMORPHONE HYDROCHLORIDE 1 MG: 1 INJECTION, SOLUTION INTRAMUSCULAR; INTRAVENOUS; SUBCUTANEOUS at 15:40

## 2020-04-16 RX ADMIN — Medication 20 MCG/MIN: at 09:22

## 2020-04-16 RX ADMIN — ONDANSETRON 4 MG: 2 SOLUTION INTRAMUSCULAR; INTRAVENOUS at 07:35

## 2020-04-16 RX ADMIN — Medication 10 ML: at 22:00

## 2020-04-16 RX ADMIN — GLYCOPYRROLATE 0.2 MG: 0.2 INJECTION INTRAMUSCULAR; INTRAVENOUS at 07:30

## 2020-04-16 RX ADMIN — MIDAZOLAM 1 MG: 1 INJECTION INTRAMUSCULAR; INTRAVENOUS at 07:30

## 2020-04-16 NOTE — PERIOP NOTES
Called daughter Madeline Meyers) to give a final update on procedure. Daughter did not answer. Left a voice message that another attempt may be made at a later time and  physician will update as well.

## 2020-04-16 NOTE — PERIOP NOTES
1615: Dr Marlow Phoenix at bedside to assess patient    [phone]: A-Line discontinued, pressure held for 10 minutes, no bleeding or bruising at site after discontinued. 1650: Dr Yelena Lee at bedside  [phone]: 0.4 Narcan given per MD  1655: 0.4 Narcan given per MD    [id number]: Dressing clean, dry and intact on back    1729: Patient arouses and moves extremities    1800: Family Omid Alvarado)  updated on patient progress    TRANSFER - IN REPORT:    Verbal report received from Dalton Steiner RN(name) on [facility]  being received from Darien Gibson RN(unit) for routine progression of care      Report consisted of patients Situation, Background, Assessment and   Recommendations(SBAR). Information from the following report(s) Procedure Summary, Intake/Output and MAR was reviewed with the receiving nurse. Opportunity for questions and clarification was provided. Assessment completed upon patients arrival to unit and care assumed.

## 2020-04-16 NOTE — PROGRESS NOTES
Reason for consultation:    Monitoring and management of  acute and chronic medical problems     Postoperative Diagnosis: L/S stenosis m43.16       Procedure: Procedure(s):  Remove instrumentation and explore fusion l3-l5, decompression l2-l3, l5-s1, smith funes osteotomy, T12-L1, L1-2, l3-l4, L5-S1 flospine t10-s1,iliac bolts, jumper rods, PSF T10-S1     Estimated Blood Loss: 425  Replaced0      [id number]        [id number]     HPI: Seen in PACU; deeply sedated- noresponsive  Sats 100 % at 2LPM  130/56  HR 76   at 1700 after lispro 6 units S/Q at 1614     cc in PACU     OR fenatyll 50 mcg x 4, last dose given 1330  PACU Fentanyl 50 mcg at 1611in   PACU Dilaudid 1 mg at 1540     Narcan 0.4 mg IV given at 1655; repeat 0.4 mg at 1705    A-line left wrist-d/c in PACU; no bleeding complication; radial pulse 2+    ACTIVE MEDICAL PROBLEMS/PMH/PSH    Patient Active Problem List   Diagnosis Code    Lumbar stenosis M48.061    Lumbar radicular pain M54.16     PMH:  Past Medical History:   Diagnosis Date    Back pain     Diabetes (HCC)     5.7 A1C    GERD (gastroesophageal reflux disease)     Hypertension     Imbalance     Kidney stone     Numbness and tingling of both lower extremities     Rheumatoid arthritis (HCC)     Weakness of both legs      LESI x 3 Dr. Carmel Morgan  Cervical radiculopathy  Left Hip OA  Left foot drop  Lumbar disc narrowing  Lumbar spinal stenosis  Compression Fracture L1 vertebrate  Sensorineural hearing loss, asymmetrical    PSH:  Past Surgical History:   Procedure Laterality Date    HX BACK SURGERY  03/02/2018    HX CATARACT REMOVAL Bilateral     HX CERVICAL FUSION      HX HERNIA REPAIR      HX LAP CHOLECYSTECTOMY      HX OTHER SURGICAL      left arn ulnar nerve repair    HX ROTATOR CUFF REPAIR Left    Lumbar decompression L3-L5 03/15  Decompression and Fusion  L3-L5 05/18    PTA MEDICATIONS  Medications Prior to Admission   Medication Sig    glimepiride (AmaryL) 1 mg tablet Take 1 mg by mouth two (2) times a day.  pantoprazole (Protonix) 40 mg tablet Take 40 mg by mouth daily.  diclofenac (VOLTAREN) 1 % gel     ferrous sulfate (SLOW FE) 142 mg (45 mg iron) ER tablet Take 142 mg by mouth every other day.  lisinopril (PRINIVIL, ZESTRIL) 5 mg tablet Take 5 mg by mouth daily.  calcium-cholecalciferol, d3, (CALCIUM 600 + D) 600-125 mg-unit tab Take  by mouth. Indications: TWICE DAILY    leflunomide (ARAVA) 20 mg tablet Take 20 mg by mouth daily.  TOFACITINIB CITRATE (XELJANZ PO) Take 5 mg by mouth two (2) times a day. Indications: TWICE DAILY       ALLERGIES:    Allergies   Allergen Reactions    Aspirin Other (comments)     Large doses stomach upset. Can tolerate baby aspirin     Bactrim [Sulfamethoprim] Rash    Gabapentin Other (comments)     Memory affected.  Paxil [Paroxetine Hcl] Unknown (comments)    Serotonin 5ht-3 Antagonists Other (comments)     Not allergic . Wrong drug.     Sulfa (Sulfonamide Antibiotics) Rash        FAMILY HISTORY   Father:  Mother:  Brothers:  Sisters :  Children    Family History   Problem Relation Age of Onset    Diabetes Mother     Heart Disease Father     Cancer Sister     Stroke Sister     Heart Disease Brother        SOCIAL HISTORY  Marital Status:  Education Completed:  Occupation:  Tobacco use: no  Alcohol use: no  Drug use: no  STI: no  Living will:   POA:     Social History     Socioeconomic History    Marital status: SINGLE     Spouse name: Not on file    Number of children: Not on file    Years of education: Not on file    Highest education level: Not on file   Occupational History    Not on file   Social Needs    Financial resource strain: Not on file    Food insecurity     Worry: Not on file     Inability: Not on file    Transportation needs     Medical: Not on file     Non-medical: Not on file   Tobacco Use    Smoking status: Former Smoker     Last attempt to quit:      Years since quittin.3    Smokeless tobacco: Never Used    Tobacco comment: quit 38 years ago. Substance and Sexual Activity    Alcohol use: No    Drug use: Never    Sexual activity: Not on file   Lifestyle    Physical activity     Days per week: Not on file     Minutes per session: Not on file    Stress: Not on file   Relationships    Social connections     Talks on phone: Not on file     Gets together: Not on file     Attends Congregational service: Not on file     Active member of club or organization: Not on file     Attends meetings of clubs or organizations: Not on file     Relationship status: Not on file    Intimate partner violence     Fear of current or ex partner: Not on file     Emotionally abused: Not on file     Physically abused: Not on file     Forced sexual activity: Not on file   Other Topics Concern    Not on file   Social History Narrative    Not on file       ROS: unable to obtain       Physical Exam:      Visit Vitals  /59   Pulse 74   Temp 95.4 °F (35.2 °C)   Resp 23   Ht 5' 7\" (1.702 m)   Wt 68 kg (150 lb)   SpO2 100%   BMI 23.49 kg/m²       GENERAL: sedated; arousable after narcan  HEENT:    Face:Symmetrical  CONJUNCTIVA: Pink. SCLERA: Anicteric    LENS OU artificial  ORAL MUCOSA: moist. no lesion  TONGUE: right side of tip  brusing  TEETH: no broken or loose tooth  GUMS: no bleeding  HARD SOFT/ PALATE/TONSILS: not examined  OROPHARYNX: not examined  NECK: No JVD. No masses. No enlarged-tender lymph nodes.  Trachea in mid line.    THYROID: Size normal. No thyroid nodules    CAROTID ARTERIES: Pulsation 2+ bilaterally. No bruits  LUNGS: CTA. BS Normal Bilaterally  HEART: RRR   Normal S1 S2. No murmur. No S3 S4  ABDOMEN: Soft. Normal BS. No HSM /SMG. No palpable enlarged aorta. No bruits. LE: No edema. SCD in place  PULSES: Radial 2+; Left 2+ after A-Line pulled out; Femoral 2+; Posterior Tibialis 1+   SKIN: No rash. No ecchymosis.     Dressing clean and dry     MUSCULOSKELETAL:      Moving all extremities approximate 20 mins after narcan 0.4mg x 2 given     NEUROLOGIC:     Responding to name      Not awake enough to assess LE muscle strength     Labs Reviewed:    Recent Results (from the past 24 hour(s))   GLUCOSE, POC    Collection Time: 04/16/20  6:14 AM   Result Value Ref Range    Glucose (POC) 118 (H) 70 - 110 mg/dL   HEMOGLOBIN A1C W/O EAG    Collection Time: 04/16/20  6:55 AM   Result Value Ref Range    Hemoglobin A1c 6.2 (H) 4.2 - 5.6 %   TYPE & SCREEN    Collection Time: 04/16/20  6:55 AM   Result Value Ref Range    Crossmatch Expiration 04/19/2020     ABO/Rh(D) O POSITIVE     Antibody screen NEG     CALLED TO: DON IN OR ON [id number] AT 0813 BY      Unit number [id number]     Blood component type RC LR,2     Unit division 00     Status of unit ALLOCATED     Crossmatch result Compatible    HGB & HCT    Collection Time: 04/16/20 10:10 AM   Result Value Ref Range    HGB 9.2 (L) 13.0 - 16.0 g/dL    HCT 27.9 (L) 36.0 - 48.0 %   GLUCOSE, POC    Collection Time: 04/16/20  3:08 PM   Result Value Ref Range    Glucose (POC) 193 (H) 70 - 110 mg/dL   GLUCOSE, POC    Collection Time: 04/16/20  3:49 PM   Result Value Ref Range    Glucose (POC) 220 (H) 70 - 110 mg/dL       ASSESSMENT  Post removal of  instrumentation and explore fusion l3-l5, decompression l2-l3, l5-s1, smith funes osteotomy, T12-L1, L1-2, l3-l4, L5-S1 flospine t10-s1,iliac bolts, jumper rods, PSF L91-T1  Metabolic encephalopathy due to analgesics; more responsive by the time of transfer to the floor  T2DM  HTN  RA    PLAN  CBC BMP TSH   Resume PTA meds as condition allows  Narcan   Lispro  Modified analgesics  Dorman out in Sukhdeep Hui MD  4/16/2020, 4:41 PM    CELL [phone]

## 2020-04-16 NOTE — OP NOTES
BRIEF OPERATIVE NOTE    Date of Procedure: 4/16/2020     Preoperative Diagnosis: m43.16    Postoperative Diagnosis: m43.16      Procedure: Procedure(s):  Remove instrumentation and explore fusion l3-l5, decompression l2-l3, l5-s1, smith funes osteotomy, T12-L1, L1-2, l3-l4, L5-S1 flospine t10-s1,iliac bolts, jumper rods, PSF T10-S1    Surgeon(s) and Role: Macho Wong MD - Primary     * Shuff, Gab Kaye MD - Co-Surgeon    Anesthesia: General     Findings: stenosis L2-3, L5S1, old L! Compression fx with kyphosis, lumbar flatback, bilat L5 pars fxs with several mm spondylo L5-S1     Estimated Blood Loss: 425  Replaced0      [id number]        [id number]    Specimens: * No specimens in log *     Tubes/Drains: None    Needle/sponge count:  Correct    Complications: 0  No change in neuro monitoing.     Plan    Dr Олег Jarvis to see  Up at day  PT ambulate with tlso  Home 3-4 days with tlso and opercocet rto 1 month

## 2020-04-16 NOTE — PROGRESS NOTES
conducted a pre-surgery visit with Christian Jimenez, who is a 76 y. o.,male. The  provided the following Interventions:  Initiated a relationship of care and support. Yeni Mohan   Board Certified [address] [place] [address]   [phone]     Offered prayer and assurance of continued prayers on patient's behalf. Plan:  Chaplains will continue to follow and will provide pastoral care on an as needed/requested basis.  recommends bedside caregivers page  on duty if patient shows signs of acute spiritual or emotional distress.

## 2020-04-17 LAB
ANION GAP SERPL CALC-SCNC: 5 MMOL/L (ref 3–18)
BUN SERPL-MCNC: 13 MG/DL (ref 7–18)
BUN/CREAT SERPL: 19 (ref 12–20)
CALCIUM SERPL-MCNC: 7.2 MG/DL (ref 8.5–10.1)
CHLORIDE SERPL-SCNC: 113 MMOL/L (ref 100–111)
CO2 SERPL-SCNC: 26 MMOL/L (ref 21–32)
CREAT SERPL-MCNC: 0.68 MG/DL (ref 0.6–1.3)
ERYTHROCYTE [DISTWIDTH] IN BLOOD BY AUTOMATED COUNT: 13.7 % (ref 11.6–14.5)
GLUCOSE BLD STRIP.AUTO-MCNC: 119 MG/DL (ref 70–110)
GLUCOSE BLD STRIP.AUTO-MCNC: 151 MG/DL (ref 70–110)
GLUCOSE BLD STRIP.AUTO-MCNC: 160 MG/DL (ref 70–110)
GLUCOSE BLD STRIP.AUTO-MCNC: 206 MG/DL (ref 70–110)
GLUCOSE SERPL-MCNC: 144 MG/DL (ref 74–99)
HCT VFR BLD AUTO: 17.3 % (ref 36–48)
HCT VFR BLD AUTO: 21.9 % (ref 36–48)
HCT VFR BLD AUTO: 22.3 % (ref 36–48)
HGB BLD-MCNC: 5.6 G/DL (ref 13–16)
HGB BLD-MCNC: 7.2 G/DL (ref 13–16)
HGB BLD-MCNC: 7.3 G/DL (ref 13–16)
MCH RBC QN AUTO: 29.9 PG (ref 24–34)
MCHC RBC AUTO-ENTMCNC: 32.7 G/DL (ref 31–37)
MCV RBC AUTO: 91.4 FL (ref 74–97)
PLATELET # BLD AUTO: 194 K/UL (ref 135–420)
PMV BLD AUTO: 10.2 FL (ref 9.2–11.8)
POTASSIUM SERPL-SCNC: 5 MMOL/L (ref 3.5–5.5)
RBC # BLD AUTO: 2.44 M/UL (ref 4.7–5.5)
SODIUM SERPL-SCNC: 144 MMOL/L (ref 136–145)
WBC # BLD AUTO: 6.2 K/UL (ref 4.6–13.2)

## 2020-04-17 PROCEDURE — 36415 COLL VENOUS BLD VENIPUNCTURE: CPT

## 2020-04-17 PROCEDURE — 82962 GLUCOSE BLOOD TEST: CPT

## 2020-04-17 PROCEDURE — 74011250636 HC RX REV CODE- 250/636: Performed by: INTERNAL MEDICINE

## 2020-04-17 PROCEDURE — 36430 TRANSFUSION BLD/BLD COMPNT: CPT

## 2020-04-17 PROCEDURE — 65270000029 HC RM PRIVATE

## 2020-04-17 PROCEDURE — 85018 HEMOGLOBIN: CPT

## 2020-04-17 PROCEDURE — 74011636637 HC RX REV CODE- 636/637: Performed by: INTERNAL MEDICINE

## 2020-04-17 PROCEDURE — 85027 COMPLETE CBC AUTOMATED: CPT

## 2020-04-17 PROCEDURE — 97530 THERAPEUTIC ACTIVITIES: CPT

## 2020-04-17 PROCEDURE — P9016 RBC LEUKOCYTES REDUCED: HCPCS

## 2020-04-17 PROCEDURE — 74011250636 HC RX REV CODE- 250/636: Performed by: ORTHOPAEDIC SURGERY

## 2020-04-17 PROCEDURE — 80048 BASIC METABOLIC PNL TOTAL CA: CPT

## 2020-04-17 PROCEDURE — 97162 PT EVAL MOD COMPLEX 30 MIN: CPT

## 2020-04-17 PROCEDURE — 51798 US URINE CAPACITY MEASURE: CPT

## 2020-04-17 PROCEDURE — 30233P1 TRANSFUSION OF NONAUTOLOGOUS FROZEN RED CELLS INTO PERIPHERAL VEIN, PERCUTANEOUS APPROACH: ICD-10-PCS | Performed by: ORTHOPAEDIC SURGERY

## 2020-04-17 PROCEDURE — 74011636637 HC RX REV CODE- 636/637: Performed by: ORTHOPAEDIC SURGERY

## 2020-04-17 PROCEDURE — 74011250637 HC RX REV CODE- 250/637: Performed by: INTERNAL MEDICINE

## 2020-04-17 RX ORDER — CEFAZOLIN SODIUM 2 G/50ML
2 SOLUTION INTRAVENOUS EVERY 8 HOURS
Status: COMPLETED | OUTPATIENT
Start: 2020-04-17 | End: 2020-04-17

## 2020-04-17 RX ORDER — SODIUM CHLORIDE 9 MG/ML
250 INJECTION, SOLUTION INTRAVENOUS AS NEEDED
Status: DISCONTINUED | OUTPATIENT
Start: 2020-04-17 | End: 2020-04-21 | Stop reason: HOSPADM

## 2020-04-17 RX ORDER — INSULIN LISPRO 100 [IU]/ML
INJECTION, SOLUTION INTRAVENOUS; SUBCUTANEOUS 4 TIMES DAILY
Status: DISCONTINUED | OUTPATIENT
Start: 2020-04-17 | End: 2020-04-21 | Stop reason: HOSPADM

## 2020-04-17 RX ORDER — CEFAZOLIN SODIUM 2 G/50ML
SOLUTION INTRAVENOUS
Status: DISPENSED
Start: 2020-04-17 | End: 2020-04-17

## 2020-04-17 RX ORDER — INSULIN GLARGINE 100 [IU]/ML
10 INJECTION, SOLUTION SUBCUTANEOUS
Status: DISCONTINUED | OUTPATIENT
Start: 2020-04-17 | End: 2020-04-21 | Stop reason: HOSPADM

## 2020-04-17 RX ADMIN — SODIUM CHLORIDE AND POTASSIUM CHLORIDE: 9; 1.49 INJECTION, SOLUTION INTRAVENOUS at 07:08

## 2020-04-17 RX ADMIN — SODIUM CHLORIDE AND POTASSIUM CHLORIDE: 9; 1.49 INJECTION, SOLUTION INTRAVENOUS at 13:55

## 2020-04-17 RX ADMIN — INSULIN LISPRO 2 UNITS: 100 INJECTION, SOLUTION INTRAVENOUS; SUBCUTANEOUS at 21:56

## 2020-04-17 RX ADMIN — Medication 10 ML: at 14:00

## 2020-04-17 RX ADMIN — Medication 10 ML: at 06:00

## 2020-04-17 RX ADMIN — INSULIN GLARGINE 10 UNITS: 100 INJECTION, SOLUTION SUBCUTANEOUS at 21:56

## 2020-04-17 RX ADMIN — INSULIN LISPRO 2 UNITS: 100 INJECTION, SOLUTION INTRAVENOUS; SUBCUTANEOUS at 08:38

## 2020-04-17 RX ADMIN — OXYCODONE HYDROCHLORIDE AND ACETAMINOPHEN 2 TABLET: 10; 325 TABLET ORAL at 05:03

## 2020-04-17 RX ADMIN — CEFAZOLIN 2 G: 10 INJECTION, POWDER, FOR SOLUTION INTRAVENOUS at 13:55

## 2020-04-17 RX ADMIN — OXYCODONE HYDROCHLORIDE AND ACETAMINOPHEN 2 TABLET: 10; 325 TABLET ORAL at 20:08

## 2020-04-17 RX ADMIN — Medication 10 ML: at 21:57

## 2020-04-17 RX ADMIN — OXYCODONE HYDROCHLORIDE AND ACETAMINOPHEN 1 TABLET: 10; 325 TABLET ORAL at 11:11

## 2020-04-17 RX ADMIN — CEFAZOLIN 2 G: 10 INJECTION, POWDER, FOR SOLUTION INTRAVENOUS at 05:47

## 2020-04-17 RX ADMIN — INSULIN LISPRO 4 UNITS: 100 INJECTION, SOLUTION INTRAVENOUS; SUBCUTANEOUS at 12:58

## 2020-04-17 NOTE — OP NOTES
[facility]  OPERATIVE REPORT    Name:  Deysi Medina  MR#:   [id number]  :  1945  ACCOUNT #:  [de-identified]  DATE OF SERVICE:  2020    PREOPERATIVE DIAGNOSES:  1. Lumbar stenosis, L2-L3 and L5-S1 status post decompression and fusion with Choice pedicle instrumentation, L3 to L5.  2.  Lumbar flatback. 3.  Spondylolisthesis, L5-S1.  4.  Old compression fracture, L1.    POSTOPERATIVE DIAGNOSES:  1. Lumbar stenosis, L2-L3 and L5-S1 status post decompression and fusion with Choice pedicle instrumentation, L3 to L5.  2.  Lumbar flatback. 3.  Spondylolisthesis, L5-S1.  4.  Old compression fracture, L1.    PROCEDURE PERFORMED:  1. Removal of Choice instrumentation and exploration of fusion, L3 to L5.  2.  Lumbar decompression, L2-L3 and L5-S1; bilateral exploration and decompression of the L2, L3, L5, and S1 nerve roots with foraminotomy and partial facetectomy. 3.  Smith-Lorenz extension osteotomy, T12-L1, L1-L2, L3-L4, and L5-S1.  4.  FloSpine pedicle instrumentation, T10 to S1, with placement of iliac bolts. 5.  Bilateral posterolateral spinal fusion, T10 to S1, with local bone graft, bone bank bone graft, autologous growth factor, and Infuse. 6.  Placement of NuVasive ASF jumper rods from the thoracic spine to the sacrum. SURGEON:  DENISE Vigil MD    ASSISTANT SURGEON:  Edwige Shetty MD    SURGICAL ASSISTANT:  Evie Mitchell    ANESTHESIA:  General.    COMPLICATIONS:  None. SPECIMENS REMOVED:  No specimen. IMPLANTS:  000    ESTIMATED BLOOD LOSS:  425 mL. FINDINGS:  The patient had previously undergone lumbar decompression and fusion with pedicle instrumentation, L3 to L5. There was relative flatback in the area of the fusion. This was exacerbated by an L1 compression fracture which was old and healed. The patient had junctional stenosis, L2-L3, L5-S1.   The patient had bilateral pars defects at L5 with several millimeter spondylolisthesis, L5-S1.    PROCEDURE:  Under general anesthesia, the patient was placed in prone position on Gretel Ao table. Hips extended, knees flexed, peripheral nerves padded. Back prepped and draped in a sterile fashion. Neuromonitoring used throughout the case. Dr. Valentino Rosas was the assistant surgeon for the entire case and assisted in every aspect of the case. A midline incision made from T10 to the sacrum, muscles subperiosteally exposed, laterally to the transverse processes, and then the instrumentation at L3 to L5 and then the sacral ala. The Choice instrumentation was removed, the fusion explored, felt to be solid. Margin of previous decompression along the caudal border of L2 sharply delineated with a curette. The inferior two-thirds of the L2 lamina were removed along with overhanging L3 superior articular facet. The L2 and L3 nerve roots decompressed bilaterally around the pedicles into the foramen, performed foraminotomy and partial facetectomy. Attention turned to the L5-S1 level. Margin of previous decompression along the cephalad border of the L5 lamina sharply delineated with a curette and then the loose L5 posterior element was removed. Decompression was then widened to the L5 and S1 pedicles and the respective L5 and S1 nerve roots decompressed bilaterally around the pedicles into the foramen, performed foraminotomy and partial facetectomy. At the end of decompression, a ball probe could be placed around the pedicles into the foramen without nerve root compromise. Smith-Lorenz extension osteotomies were then performed at T12-L1, L1-L2, L3-L4, and L5-S1. At the upper two levels, the inferior lamina, pars, inferior articular facet, and superior articular facet bilaterally were removed. At L3-L4, the patient had a solid fusion. The fusion was taken down and removed between the L3 and L4 pedicles bilaterally.   At L5-S1, superior portion of the S1 superior articular facet was resected along with the proximal L5 pars at the bilateral fracture site. Pedicle screws were then placed, T10 to L2 and also at S1 under x-ray control. Holes drilled with depth gauge, felt to be within bone, metallic markers placed, and radiographs were satisfactory. Screws were placed, T10 to L2. Posterolateral elements, L2 to S1 decorticated with Midas Javier drill, followed by placement of BMP and bone graft material.  Pedicle screws were then placed, L3 to S1. In a similar fashion, right-hand side decorticated, bone grafted, and screws placed. Iliac bolts were then placed bilaterally through separate subfascial incisions. Holes drilled with depth gauge, felt to be within bone. Screw placed. Lateral connectors tunneled anteriorly to the erector spinae muscle, secured to the iliac bolts. Rods were contoured in lordosis and secured in the iliac bolt lateral connectors and the S1 pedicle screws bilaterally. Unscrubbed assistants then elevated the lower extremities, thereby extending the lumbar spine and closing the osteotomy sites. The rods were then introduced into the pedicle screws from distal to proximal, further increasing lumbar lordosis. All set screws were then tightened and torqued to 120-inch pounds. Neural elements inspected at the osteotomy sites and nerve roots were without compromise. Posterolateral elements, T10 to L2 decorticated with Midas Javier drill, followed by placement of bone graft material.  Additional bone graft placed lateral to the rods in the lumbar spine. NuVasive ASF jumper rods were then placed bilaterally with distal connector between the S1 screw and the iliac bolt lateral connector and then two more connectors proximally on either side. Aleksandar placed, followed by set screws which were tightened and torqued. Final radiographs obtained were satisfactory. There has been no change in neuromonitoring throughout the case.   All pedicle screws were stimulated and potentials were satisfactory. Wound then closed in layers with powdered vancomycin in deep subcutaneous tissue. Wound dressed. The patient awakened, taken to recovery room in satisfactory condition without complication. Estimated blood loss 425 mL. The patient received 1500 mL crystalloid fluid. 500 mL urine output. No specimen. No tubes or drains. Needle and sponge count correct without complication. At time of dictation, the patient not awakened sufficiently to test motor or sensation. Sharron Boyd MD      [id number]/  D:  04/16/2020 16:35  T:  04/17/2020 0:52  JOB #:  [id number]  CC:   MD Zari Dc MD Leann Hau, MD

## 2020-04-17 NOTE — PROGRESS NOTES
Orthopaedics    Patient without new complaints status post REMOVE SPINE FIX DEV,POST SGMTAL [75070] (SPINE LUMBAR POSTERIOR INTERBODY FUSION (PLIF))  EXPLORATION OF SPINAL FUSION [76906]  LAMINEC/FACETECT/FORAMIN,LUMBAR [45963]  LAMINEC/FACETECT/FORAMIN,EACH ADDNL [93574]  OH ARTHDSIS POST/POSTEROLATRL/POSTINTERBODY LUMBAR [68290]  OH ARTHDSIS POST/POSTERLATRL/POSTINTRBDYADL SPC/SEG [31788]  OSTEOTOMY LUMB SP,POST,1 LVL [12279]  OSTEOTOMY,POST,EA ADDN SGMT [90195]  INSERT VERT FIX DEV,POST,7-12 SGMTS [57108]  INSERT PELVIC FIXATION DEVICE [76667]  OH OSTEOTOMY SPINE POSTERIOR 3 COLUMN LUMBAR [22207]  OH ALLOGRAFT FOR SPINE SURGERY ONLY STRUCTURAL [71597] for Lumbar stenosis [M48.061] 4/16/2020. Pt complaining of surgical pain and continued numbness dorsal foot. Dorman out    Visit Vitals  /44 (BP 1 Location: Right arm, BP Patient Position: Sitting)   Pulse 97   Temp 98.5 °F (36.9 °C)   Resp 18   Ht 5' 7\" (1.702 m)   Wt 68 kg (150 lb)   SpO2 96%   BMI 23.49 kg/m²       CBC w/Diff    Lab Results   Component Value Date/Time    WBC 6.2 04/17/2020 04:30 AM    RBC 2.44 (L) 04/17/2020 04:30 AM    HCT 22.3 (L) 04/17/2020 04:30 AM    MCV 91.4 04/17/2020 04:30 AM    MCH 29.9 04/17/2020 04:30 AM    MCHC 32.7 04/17/2020 04:30 AM    RDW 13.7 04/17/2020 04:30 AM    Lab Results   Component Value Date/Time    MONOS 3 04/16/2020 05:25 PM    EOS 0 04/16/2020 05:25 PM    BASOS 0 04/16/2020 05:25 PM    RDW 13.7 04/17/2020 04:30 AM          Physical exam:  Pt gotten oob. Is compensated in the sagittal plane. Sl to L in coronal plane. Dressing dry. AT, GS intact  Bilateral Lower Extremities. Sens sl decreased dorsal feet. Calves soft and nontender.        Assessment:  Status post REMOVE SPINE FIX DEV,POST SGMTAL [45210] (SPINE LUMBAR POSTERIOR INTERBODY FUSION (PLIF))  EXPLORATION OF SPINAL FUSION [08794]  LAMINEC/FACETECT/FORAMIN,LUMBAR [57046]  LAMINEC/FACETECT/FORAMIN,EACH ADDNL [99172]  OH ARTHDSIS POST/POSTEROLATRL/POSTINTERBODY LUMBAR [95982]  DC ARTHDSIS POST/POSTERLATRL/POSTINTRBDYADL SPC/SEG [91632]  OSTEOTOMY LUMB SP,POST,1 LVL [20789]  OSTEOTOMY,POST,EA ADDN SGMT [44422]  INSERT VERT FIX DEV,POST,7-12 SGMTS [42878]  INSERT PELVIC FIXATION DEVICE [94501]  DC OSTEOTOMY SPINE POSTERIOR 3 COLUMN LUMBAR [50899]  DC ALLOGRAFT FOR SPINE SURGERY ONLY STRUCTURAL [65139] for Lumbar stenosis [M48.061] 4/16/2020,  progressing. PLAN:  Monitor voiding. Mobilize. Continue P.T. Discharge Planning.     Dionicia Frankel, MD  April 17, 2020

## 2020-04-17 NOTE — OP NOTES
[facility]  OPERATIVE REPORT    Name:  Mana Leung  MR#:   [id number]  :  1945  ACCOUNT #:  [de-identified]  DATE OF SERVICE:  2020    PREOPERATIVE DIAGNOSES:  1. Isthmic spondylolisthesis, L5-S1.  2.  Flat back deformity associated with lumbar spondylosis, L1 to S1.  3.  History of compression fracture, L1.  4.  Acute lumbar radiculopathy, bilateral L5. POSTOPERATIVE DIAGNOSES:  1. Isthmic spondylolisthesis, L5-S1.  2.  Flat back deformity associated with lumbar spondylosis, L1 to S1.  3.  History of compression fracture, L1.  4.  Acute lumbar radiculopathy, bilateral L5. PROCEDURE PERFORMED:  1. Posterior posterolateral intertransverse fusion, T10 to S1.  2.  Decompressive laminectomy including partial medial facetectomy and foraminotomies, single lumbar segment, L2-L3. 3.  Segmental T10 to S1 pedicle screw and tala instrumentation using FloSpine pedicle screw system. 4.  Ye-Lorenz posterior extension osteotomy, single thoracic segment, T12-L1. 5.  Ye-Lorenz osteotomy, single lumbar segment, L1-L2, including extension osteotomy for correction of flat back deformity. 6.  Decompressive laminectomy including partial medial facetectomy and foraminotomy, additional lumbar segment, L5-S1.  7.  Ye-Lorenz osteotomy, extension type, for correction of flat back deformity, additional lumbar segment, L3-L4. 8.  Ye-Lorenz osteotomy, extension type, for correction of flat back deformity, additional lumbar segment, L5-S1.  9.  Bilateral iliac bolt fixation with lateral connection to longitudinal rods placed at T10 to S1, zone 3.  10.  Exploration of existing spinal fusion, L3 to L5. 11.  Removal of segmental instrumentation, L3 to L5.  12.  Ancillary rods, T10 to S1 bilaterally. 13.  Autograft, for spinal surgery only, morselized, same fascial incision. 14.  Allograft, morselized, for spinal surgery only.     SURGEON:  Tamiko James MD    ASSISTANT:  Aleah Burkett III, MD    ANESTHESIA:  General endotracheal.    COMPLICATIONS:  None, no change in nerve monitoring, baseline and post position, and throughout the operative procedure including direct pedicle screw testing. SPECIMENS REMOVED:  No specimens. DRAINS:  No drains. IMPLANTS:    FlowSpine pedicle screws/tala; 5.5-6.5mm diameter, 40-50mm lengths, T10-S1  8.5mm x 85mm iliac bolts, bilateral    NuVasive lateral connectors x 6 (3/side)    ESTIMATED BLOOD LOSS:  425 mL. No replacement. FLUIDS:  Crystalloids, 1500 mL. URINE OUTPUT:  500 mL. COUNTS:  Needle and sponge counts correct x2. FINDINGS:  1.  Lumbar canal stenosis, central lateral recess, L2-L3, L5-S1.  2.  Old L1 compression fracture with associated kyphosis. 3.  Lumbar flat back deformity. 4.  Bilateral pars fractures with spondylolisthesis, L5-S1. POSTOPERATIVE PLAN:  The patient to be evaluated and admitted to the recovery room and subsequently to floor. Dr. Latasha Malone to facilitate and evaluate the patient medically. The patient can be up as tolerated. He will use the lumbar brace as required when up and about. Anticipate discharge over the next several days with pain medication, routine IV antibiotics, and IV fluids ongoing. Physical Therapy, Occupational Therapy to evaluate as well. INDICATIONS FOR PROCEDURE:  This is a 68-year-old pleasant gentleman, regularly followed by Dr. Mahnaz Sandoval. The patient had failed lumbar fusion with regard to associated flat back deformity, had anterolisthesis at L5-S1 and history of a compression fracture causing kyphosis, all of which created neuropathic pain and flat back deformity as well as posterior lumbar discomfort. He failed conservative efforts inclusive of, but not limited to medications and standard routine options.   The indication for surgery is to decompress neurologic structures, stabilize the spine, and correct his spinal deformity for a long-term solution to his flat back deformity. PROCEDURE:  After informed consent was obtained detailing risks, benefits, alternatives and providing no guarantees, the patient being n.p.o., perioperative IV antibiotics and anesthesia evaluation, he was wheeled from the holding area to the operative theater. He underwent rapid sequence induction with endotracheal intubation without difficulty. Routine lines were established. Baseline transcranial motor evoked potentials and somatosensory evoked potentials were established and were re-performed throughout the operative case, noting no change. Prone positioning then followed with all bony prominences appropriately padded and neck in neutral.  A standard sterile orthopedic prep and drape was then performed. Previous to prep and drape, the patient's anatomy was visualized and could be well identified on AP and lateral fluoroscopy. Once prep and drape was completed, a time-out procedure was performed, confirming site, location, and plan for surgery. A midline incision was made roughly 12 inches in length, starting at the anticipated T10 spinous process and pedicle level, extending down to the S1 level. Dissection was then taken in line with our surgical incision and a standard subperiosteal dissection performed at areas previously untreated or without previous surgical exposure. This involved fascial dissection and subperiosteal dissection of musculature off the spinous process over the lamina to the facet. Self-retaining retractors were placed. Down at the L2-L3 level, we identified midline bone, worked our way over towards the lamina, and identified previous hardware at L3. We used that to facilitate dissection down the hardware on the left and right sides and worked back towards midline at the L5 and S1 segment where there was no evidence of laminectomy on preoperative CT myelogram.    Once our exposure was complete, self-retaining retractors were placed, we removed the instrumentation. This required set screw removal, tala removal, and subsequent pedicle screw removal.  All pedicle screws had reasonable feel with no fracture or gross malposition. The L5 pedicle screws appeared to be slightly inferior, although there were bony confines to this region. Once the set screws were removed, additional scar was removed from the dorsal spine down to the level of the dura. We were able to develop a plane between the L2 spinous process and the central lamina and work our way down on both left and right sides. There was a small amount of right-sided residual lamina easily developed. This required angled curette application, a Penfield #2 insertion for epidural anatomy, and subsequent Kerrison punch removal.  We completed L2-L3 laminectomy all the way up to the level of the pedicle with both width and height [id number] decompression was performed. We then worked our way down to the L3 pedicle and skeletonized it with regard to the L3-L4 pars and articularis and focused on the Smith-Lorenz osteotomy at this location. This was fairly well accomplished and the superior pedicles at L4 was also identified on both left and right sides. This was worked out to the lateral pedicle margin, protecting our nerve roots and performing a wide Ye-Lorenz osteotomy. This was intended to allow for extension osteotomy through this location. We then worked our way up to the L1-L2 level and performed a central decompression of the thecal sac and worked our way laterally to perform the Ye-Lorenz osteotomy at L1-L2. This also required inferior facet resection, superior facet resection, and skeletonization of the superior pedicle of L2 and inferior pedicle of L1 on both the left and right sides, protecting the investing dura and nerve roots. This was replicated at T41 and L1 as well. We then worked our way down to the L5-S1 location and removed scar centrally.   We were able to work underneath the inferior lamina of L5. We connected the interval between the epidural space at the inferior and superior portions of the L5 lamina. We resected the central bone and then worked on left and right sides to remove what was now identified as a Quiñones fragment. This decompression worked its way out to the pars interarticularis. We were able to then use the L5 pedicle as a landmark and skeletonize it inferiorly. The S1 pedicle was also skeletonized superiorly. This completed the Smith-Lorenz osteotomy and previous decompression as noted, L5-S1. The L5 and S1 nerve roots were widely patent at the completion of this decompression. With our decompression now complete, noting no CSF leak, we worked on instrumentation. A standard pedicle screw instrumentation technique was then used. This involved high-speed lebron decortication, gearshift awl insertion, followed by beaded probe application and screw fixation. This was done both anatomically and with fluoroscopic image enhancement. Pedicle screws were placed at T10 and moving caudally all the way down to S1. All screws had excellent purchase and measured between 5.5 mm in diameter and 6.5 mm in diameter and up to 50 mm in length. We were able to replicate or reinsert screws at the L3 to L5 levels previously instrumented. We also instrumented the S1 level. Instrumentation of S1 required lebron application, followed by gearshift awl insertion and Steinmann pin placement. This was advanced under fluoroscopy to the promontory. Once advanced, it was removed and depth assessed. On the right, we placed a 6.5 x 45 mm screw with excellent purchase. On the left, we placed a 6.5 mm x 40 mm screw with excellent purchase. All pedicle screws were then stimulated and noted no lower extremity response or milliamperage response less than 12 mA. Radiographic interpretation yielded good position of pedicle screws without sequela, with appropriate length and fixation.   With our screws now in place, we moved over to iliac bolt fixation. We split the fascia on both left and right sides, worked to the posterior superior iliac spine, resected a 1 cm cubic amount and making the resection flush with the sacrum. Gearshift awl insertion targeted towards the tip of the greater trochanter on the ipsilateral side was performed between the inner and outer tables. Depth assessment noted, beaded probe application performed, and confirmed good position of the cannulation. 8.5 mm x 85 mm screws were placed between the inner and outer tables with excellent fixation. Short lateral connectors were then placed, aligning them with the S1 screws and the anticipated vertically oriented rods. Contour rods were then placed caudally into the lateral connection S1 vertebral segment. The patient's legs were then elevated and his hip and pelvis placed in extension, causing an excellent reduction and closure of our Smith-Lorenz osteotomies. This occurred at both the L5-S1, the L3-L4, the L1-L2, and T12-L1 locations, noting excellent correction. A series of insertion of set screws starting caudally and migrating proximally were performed. A subtle in situ tala contouring afforded. All pedicle screws received a set screw without evidence of any sequela with the pedicle screw fixation and significant correction of lordosis noted. The correction was accomplished and increased our lordosis by an estimated 25 degrees. His preoperative lordosis was estimated at around 40 degrees of lordosis. His pelvic incidence was estimated at 60 degrees. With our correction now afforded, we locked the pedicle screws with our torque limiter and counter-torque device per  recommendation. This allowed for final fixation of the T10 to S1 segments with iliac fixation control. We then placed our lateral connectors and these were placed medial to our longitudinal rods.   These were placed at the S1 to pelvic connection on both left and right sides between the L2 and L3 location on both left and right sides and extending all the way up to the T10 level. Two additional ancillary rods were placed and secured. These were torqued once appropriately contoured. This was used as a support mechanism to our existing tala structure. Prior to all screw insertion and tala fixation, decortication of the lateral intertransverse locations around the transverse processes L1 down to the sacral ala was performed. Bone morphogenetic protein allograft and autograft was placed in the depths of our exposure and laterally. This was performed prior to definitive instrumentation listed above. Once the instrumentation was complete, we decorticated from the L1 level posteriorly and extended this up to T12, T11, and T10. More of a central and posterior bone graft technique was used at these locations. Copious irrigation was performed throughout the extent of our dissection and subsequent closure. Once the bone graft was affixed, a layered closure ensued. This was done over powdered vancomycin. Sterile dressing was applied. The patient was then rolled to a supine position, reversed, extubated, and wheeled to the recovery room in stable and satisfactory condition, having tolerated the procedure well. There were no immediate anesthetic, medical, or surgical complications. Sponge, neuro nelli, and needle counts were correct x2 at the conclusion of the case, and Tobias DANG, the attending surgeon was present and was the co-surgeon for this procedure. Dr. Jovanni Morales co-surgeon was also present and performed the procedure and dictated a separate report.       Sue Carlos MD      [id number]  D:  04/16/2020 22:55  T:  04/17/2020 8:35  JOB #:  [id number]

## 2020-04-17 NOTE — PROGRESS NOTES
Problem: Mobility Impaired (Adult and Pediatric)  Goal: *Acute Goals and Plan of Care (Insert Text)  Description: Physical Therapy Goals  Initiated 4/17/2020 and to be accomplished within 7 day(s)  1. Patient will move from supine to sit and sit to supine  in bed with modified independence. 2.  Patient will transfer from bed to chair and chair to bed with modified independence using the least restrictive device. 3.  Patient will perform sit to stand with modified independence. 4.  Patient will ambulate with independence for 50 feet with the least restrictive device. 5.  Patient will ascend/descend 5 stairs with handrail(s) with modified independence. Outcome: Progressing Towards Goal   PHYSICAL THERAPY EVALUATION    Patient: Cassia Garg (29 y.o. male)  Date: 4/17/2020  Primary Diagnosis: Lumbar stenosis [M48.061]  Procedure(s) (LRB):  Remove instrumentation and explore fusion l3-l5, decompression l2-l3, l5-s1, smith funes osteotomy, l1-l2, l3-l4, flospine t10-s1,iliac bolts, jumper rods (N/A) 1 Day Post-Op   Precautions: Fall  PLOF: Independent in home; Mod I with cane in community  ASSESSMENT :  Seated in chair with TLSO donned. Educated on role of TLSO and lumbar precautions. Talkative and impulsive with mobility. Poor safety awareness. Requires encouragement for trial of mobility and re-direction to attend to present task. Min A x2 for sit to stand from recliner. Max sequencing cues and encouragement for trial. Min A x2 for amb 3ft with ww for chair to bed transfer. Seated in bed. Dependent to doff TLSO. Max A for sit to supine. Self scoots up in bed with verbal cues. MD present and request patient attempt OOB and standing while present. Max A for supine to sit with MD. Brien Diss A for sit to stand to ww. Standing balance min A with ww; 1 minute. Sidesteps x2ft with min A and ww. Returned to seated EOB. Max A x2 for sit to supine transfer.   Re-enforcement of lumbar precautions and logroll technique. Seated in bed with HOB elevated and call bell in reach. Educated on need for RN assistance with mobility; verbalized understanding. Would recommend discharge to skilled nursing facility for subacute rehab d/t increased level of physical assistance required with functional mobility. May quickly progress to home with home health pending progression with PT and length of stay. Patient will benefit from skilled intervention to address the above impairments. Patient's rehabilitation potential is considered to be Fair  Factors which may influence rehabilitation potential include:   []         None noted  []         Mental ability/status  [x]         Medical condition  []         Home/family situation and support systems  []         Safety awareness  [x]         Pain tolerance/management  [x]         Other: PLOF     PLAN :  Recommendations and Planned Interventions:   [x]           Bed Mobility Training             [x]    Neuromuscular Re-Education  [x]           Transfer Training                   []    Orthotic/Prosthetic Training  [x]           Gait Training                          []    Modalities  [x]           Therapeutic Exercises           []    Edema Management/Control  [x]           Therapeutic Activities            [x]    Family Training/Education  [x]           Patient Education  []           Other (comment):    Frequency/Duration: Patient will be followed by physical therapy 3-5 times a week to address goals. Discharge Recommendations: [facility]; May quickly progress to home with home health  Further Equipment Recommendations for Discharge: brace/splint and rolling walker     SUBJECTIVE:   Patient stated I'm spoiled.     OBJECTIVE DATA SUMMARY:     Past Medical History:   Diagnosis Date    Back pain     Diabetes ([address]     5.7 A1C    GERD (gastroesophageal reflux disease)     Hypertension     Imbalance     Kidney stone     Numbness and tingling of both lower extremities Rheumatoid arthritis (HCC)     Weakness of both legs      Past Surgical History:   Procedure Laterality Date    HX BACK SURGERY  03/02/2018    HX CATARACT REMOVAL Bilateral     HX CERVICAL FUSION      HX HERNIA REPAIR      HX LAP CHOLECYSTECTOMY      HX OTHER SURGICAL      left arn ulnar nerve repair    HX ROTATOR CUFF REPAIR Left      Barriers to Learning/Limitations: yes;  other safety  Compensate with: Visual Cues, Verbal Cues, Tactile Cues and Kinesthetic Cues    Home Situation:  Home Situation  Home Environment: Private residence  # Steps to Enter: 13  One/Two Story Residence: One story  Living Alone: No  Support Systems: Family member(s)  Patient Expects to be Discharged to[de-identified] Private residence  Current DME Used/Available at Home: [employer]    Critical Behavior:  Neurologic State: Alert  Orientation Level: Oriented X4  Cognition: Impulsive     Psychosocial  Patient Behaviors: Cooperative; Talkative    Strength:    Manual Muscle Testing (LE)         R     L    Hip Flexion:   4/5  4/5  Knee EXT:   4/5  4/5  Knee FLEX:   4/5  4/5  Ankle DF:   4/5  3+/5  _________________________________________________   Range Of Motion:  BLE AROM WFL  Functional Mobility:  Bed Mobility:  Rolling: Minimum assistance  Supine to Sit: Maximum assistance  Sit to Supine: Maximum assistance  Transfers:  Sit to Stand: Minimum assistance;Assist x2  Stand to Sit: Minimum assistance;Assist x2  Bed to Chair: Minimum assistance;Assist x2  Balance:   Sitting: Impaired  Sitting - Static: Fair (occasional)  Sitting - Dynamic: Fair (occasional)  Standing: Impaired  Standing - Static: Fair  Standing - Dynamic : Poor  Ambulation/Gait Training:  Distance (ft): (3ft, 2ft)   Assistive Device: Walker, rolling;Brace/Splint  Ambulation - Level of Assistance: Minimal assistance    Neuro Re-education:  Seated balance; 5 minutes  Standing 3 minutes  Therapeutic Exercises:   Sit to stand x2  Pain:  Pain level pre-treatment: 4/10   Pain level post-treatment: 7/10     Activity Tolerance:   Fair    After treatment:   []         Patient left in no apparent distress sitting up in chair  [x]         Patient left in no apparent distress in bed  [x]         Call bell left within reach  [x]         Nursing notified  []         Caregiver present  []         Bed alarm activated  []         SCDs applied    COMMUNICATION/EDUCATION:   [x]         Role of physical therapy and plan of care in the acute care setting. [x]         Fall prevention education was provided and the patient/caregiver indicated understanding. [x]         Patient/family have participated as able in goal setting and plan of care. []         Patient/family agree to work toward stated goals and plan of care. []         Patient understands intent and goals of therapy, but is neutral about his/her participation. []         Patient is unable to participate in goal setting/plan of care: ongoing with therapy staff.     Thank you for this referral.  Bobbi Stewart, PT   Time Calculation: 45 mins    Eval Complexity: History: MEDIUM  Complexity : 1-2 comorbidities / personal factors will impact the outcome/ POC Exam:MEDIUM Complexity : 3 Standardized tests and measures addressing body structure, function, activity limitation and / or participation in recreation  Presentation: MEDIUM Complexity : Evolving with changing characteristics  Clinical Decision Making:Medium Complexity    Clinical judgement; ROM, MMT, functional mobility Overall Complexity:MEDIUM

## 2020-04-17 NOTE — CDMP QUERY
Pt admitted with Lumbar stenosis. Pt noted to have Estimated Blood Loss: 425ml during surgery. If possible, please document in the progress notes and d/c summary if you are evaluating and / or treating any of the following: ? Anemia due to postoperative blood loss (please specify if expected or a complication of the surgery) ? Dilutional anemia 
? Other, please specify ? Clinically unable to determine The medical record reflects the following: 
 
   Risk Factors: Surgical procedure, DM, former smoker Clinical Indicators:  
 > HGB 9.9, 7.3 
 > HCT 30.5, 22.3 Treatment: receiving serial CBC, IV fluids Thank you, Sally Billings, RN, BSN, [address] [place] [address] 
[phone]

## 2020-04-17 NOTE — ANESTHESIA POSTPROCEDURE EVALUATION
Procedure(s):  Remove instrumentation and explore fusion l3-l5, decompression l2-l3, l5-s1, smith funes osteotomy, l1-l2, l3-l4, flospine t10-s1,iliac bolts, jumper rods. general    Anesthesia Post Evaluation      Multimodal analgesia: multimodal analgesia used between 6 hours prior to anesthesia start to PACU discharge  Patient location during evaluation: bedside  Patient participation: complete - patient participated  Level of consciousness: awake  Pain management: adequate  Airway patency: patent  Anesthetic complications: no  Cardiovascular status: stable  Respiratory status: acceptable  Hydration status: acceptable  Post anesthesia nausea and vomiting:  controlled      Vitals Value Taken Time   /76 4/16/2020  6:10 PM   Temp 35.2 °C (95.4 °F) 4/16/2020  3:51 PM   Pulse 76 4/16/2020  6:14 PM   Resp 16 4/16/2020  6:14 PM   SpO2 100 % 4/16/2020  6:15 PM   Vitals shown include unvalidated device data.

## 2020-04-17 NOTE — PROGRESS NOTES
0750 Bedside and Verbal shift change report given to Siena Hwang and Erika Steiner RN (oncoming nurse) by Candida Hashimoto, LPN (offgoing nurse). Report included the following information SBAR, Kardex, Intake/Output and MAR. \    7418 Received patient lying on bed, alert and oriented x4, no signs of respiratory distress, nausea and vomiting, denies pain at this time, IV sites checked and patent, no signs of infiltration, back dressing checked and intact, clean and dry,  bed in low position and locked,  call bell in reach, continue to monitor    1000 bladder scan c/o jerman, 145ml    1100 percocet 1 tab given, pain 6/10 , will reassess,  Bladder scan 129ml    1130 pt  Voided in urinal 125ml shyam clear urine, no sediment    1300 cannot pull the 1300 zosyn due to pyxis order does not coincide with the mar, called pharmacy to fix    1400 voided again 280 ml    1700 patient looks pale, Dr Hernan Bean in the unit to do rounds    1845 IV RUE dislodged, removed and intact, infused current IV at RUE, patient looks pale, waiting for lab results    1917 attempted to call Dr Hernan Bean to inform regarding  Critical results, left voicemail and waiting for call back, to endorse to next shift. 1930 spoke to Dr. Hernan Bean on the phone, orders were given, pls see orders    1958 Bedside and Verbal shift change report given to Anthony Sanchez RN (oncoming nurse) by Siena Hwang RN (offgoing nurse). Report included the following information SBAR, Kardex, Intake/Output, MAR and Recent Results.

## 2020-04-17 NOTE — PROGRESS NOTES
POD 1  Postoperative Diagnosis: L/S stenosis m43.16       Procedure: Procedure(s):  Remove instrumentation and explore fusion l3-l5, decompression l2-l3, l5-s1, smith funes osteotomy, T12-L1, L1-2, l3-l4, L5-S1 flospine t10-s1,iliac bolts, jumper rods, PSF T10-S1     Estimated Blood Loss: 425  Replaced0      [id number]        [id number]     HPI:   Uneventful last 24 hrs  Post op pain controlled, no leg pain. No numbness or weakness except for PTA left foot drop  No CP SOB  No N/V; passing flatus  Dorman out; voiding well    ACTIVE MEDICAL PROBLEMS/PMH/PSH    Patient Active Problem List   Diagnosis Code    Lumbar stenosis M48.061    Lumbar radicular pain M54.16     PMH:  Past Medical History:   Diagnosis Date    Back pain     Diabetes [address] [address] [address]     5.7 A1C    GERD (gastroesophageal reflux disease)     Hypertension     Imbalance     Kidney stone     Numbness and tingling of both lower extremities     Rheumatoid arthritis (HCC)     Weakness of both legs      LESI x 3 Dr. Jennifer Sher  Cervical radiculopathy  Left Hip OA  Left foot drop  Lumbar disc narrowing  Lumbar spinal stenosis  Compression Fracture L1 vertebrate  Sensorineural hearing loss, asymmetrical    PSH:  Past Surgical History:   Procedure Laterality Date    HX BACK SURGERY  03/02/2018    HX CATARACT REMOVAL Bilateral     HX CERVICAL FUSION      HX HERNIA REPAIR      HX LAP CHOLECYSTECTOMY      HX OTHER SURGICAL      left arn ulnar nerve repair    HX ROTATOR CUFF REPAIR Left      Lumbar decompression L3-L5 03/15  Decompression and Fusion  L3-L5 05/18    PTA MEDICATIONS  Medications Prior to Admission   Medication Sig    glimepiride (AmaryL) 1 mg tablet Take 1 mg by mouth two (2) times a day.  pantoprazole (Protonix) 40 mg tablet Take 40 mg by mouth daily.  diclofenac (VOLTAREN) 1 % gel     ferrous sulfate (SLOW FE) 142 mg (45 mg iron) ER tablet Take 142 mg by mouth every other day.     lisinopril (PRINIVIL, ZESTRIL) 5 mg tablet Take 5 mg by mouth daily.  calcium-cholecalciferol, d3, (CALCIUM 600 + D) 600-125 mg-unit tab Take  by mouth. Indications: TWICE DAILY    leflunomide (ARAVA) 20 mg tablet Take 20 mg by mouth daily.  TOFACITINIB CITRATE (XELJANZ PO) Take 5 mg by mouth two (2) times a day. Indications: TWICE DAILY       ALLERGIES:    Allergies   Allergen Reactions    Aspirin Other (comments)     Large doses stomach upset. Can tolerate baby aspirin     Bactrim [Sulfamethoprim] Rash    Gabapentin Other (comments)     Memory affected.  Paxil [Paroxetine Hcl] Unknown (comments)    Serotonin 5ht-3 Antagonists Other (comments)     Not allergic . Wrong drug.  Sulfa (Sulfonamide Antibiotics) Rash        FAMILY HISTORY   Father:  Mother:  Brothers:  Sisters :  Children    Family History   Problem Relation Age of Onset    Diabetes Mother     Heart Disease Father     Cancer Sister     Stroke Sister     Heart Disease Brother        SOCIAL HISTORY  Marital Status:  Education Completed:  Occupation:  Tobacco use: no  Alcohol use: no  Drug use: no  STI: no  Living will:   POA:     Social History     Socioeconomic History    Marital status: SINGLE     Spouse name: Not on file    Number of children: Not on file    Years of education: Not on file    Highest education level: Not on file   Occupational History    Not on file   Social Needs    Financial resource strain: Not on file    Food insecurity     Worry: Not on file     Inability: Not on file   [employer] needs     Medical: Not on file     Non-medical: Not on file   Tobacco Use    Smoking status: Former Smoker     Last attempt to quit: 1978     Years since quittin.3    Smokeless tobacco: Never Used    Tobacco comment: quit 38 years ago.    Substance and Sexual Activity    Alcohol use: No    Drug use: Never    Sexual activity: Not on file   Lifestyle    Physical activity     Days per week: Not on file     Minutes per session: Not on file    Stress: Not on file Relationships    Social connections     Talks on phone: Not on file     Gets together: Not on file     Attends [employer] service: Not on file     Active member of club or organization: Not on file     Attends meetings of clubs or organizations: Not on file     Relationship status: Not on file    Intimate partner violence     Fear of current or ex partner: Not on file     Emotionally abused: Not on file     Physically abused: Not on file     Forced sexual activity: Not on file   Other Topics Concern    Not on file   Social History Narrative    Not on file       ROS:  Constitutional:  No headache. Cardiac: HPI  Respiratory: No cough  GI: HPI  : HPI  Skin: No rash. No itching      Physical Exam:      Visit Vitals  /51 (BP 1 Location: Right arm, BP Patient Position: At rest)   Pulse 66   Temp 98.2 °F (36.8 °C)   Resp 18   Ht 5' 7\" (1.702 m)   Wt 68 kg (150 lb)   SpO2 96%   BMI 23.49 kg/m²     GENERAL: Awake Alert NAD  LUNGS: CTA. BS Normal Bilaterally  HEART: tachycardic 110/min   Normal S1 S2. 1-2/6 MRM. No S3 S4  ABDOMEN: Soft. Normal BS. No tenderness  LE: pedal edema. SCD in place  PULSES: Radial 2+ B/L; PT 1+ B/L    Dressing clean and dry per nursing staff    NEUROLOGIC:     Oriented X 4  Muscle Strength  RLE: quads 5/5 AT 5/5 GCS 5/5  LLE: quads 5/5 dorsiflexion 2-3/5 plantar flexion 5/5 GCS 5/5    Labs Reviewed:    Recent Results (from the past 24 hour(s))   CBC WITH AUTOMATED DIFF    Collection Time: 04/16/20  5:25 PM   Result Value Ref Range    WBC 9.8 4.6 - 13.2 K/uL    RBC 3.30 (L) 4.70 - 5.50 M/uL    HGB 9.9 (L) 13.0 - 16.0 g/dL    HCT 30.5 (L) 36.0 - 48.0 %    MCV 92.4 74.0 - 97.0 FL    MCH 30.0 24.0 - 34.0 PG    MCHC 32.5 31.0 - 37.0 g/dL    RDW 13.7 11.6 - 14.5 %    PLATELET 143 170 - 749 K/uL    MPV 10.3 9.2 - 11.8 FL    NEUTROPHILS 93 (H) 40 - 73 %    LYMPHOCYTES 4 (L) 21 - 52 %    MONOCYTES 3 3 - 10 %    EOSINOPHILS 0 0 - 5 %    BASOPHILS 0 0 - 2 %    ABS.  NEUTROPHILS 9.1 (H) 1.8 - 8.0 K/UL    ABS. LYMPHOCYTES 0.4 (L) 0.9 - 3.6 K/UL    ABS. MONOCYTES 0.3 0.05 - 1.2 K/UL    ABS. EOSINOPHILS 0.0 0.0 - 0.4 K/UL    ABS.  BASOPHILS 0.0 0.0 - 0.1 K/UL    DF AUTOMATED     METABOLIC PANEL, BASIC    Collection Time: 04/16/20  5:25 PM   Result Value Ref Range    Sodium 141 136 - 145 mmol/L    Potassium 4.2 3.5 - 5.5 mmol/L    Chloride 109 100 - 111 mmol/L    CO2 23 21 - 32 mmol/L    Anion gap 9 3.0 - 18 mmol/L    Glucose 225 (H) 74 - 99 mg/dL    BUN 10 7.0 - 18 MG/DL    Creatinine 0.75 0.6 - 1.3 MG/DL    BUN/Creatinine ratio 13 12 - 20      GFR est AA >60 >60 ml/min/1.73m2    GFR est non-AA >60 >60 ml/min/1.73m2    Calcium 7.6 (L) 8.5 - 10.1 MG/DL   TSH 3RD GENERATION    Collection Time: 04/16/20  5:25 PM   Result Value Ref Range    TSH 1.03 0.36 - 3.74 uIU/mL   GLUCOSE, POC    Collection Time: 04/16/20  9:44 PM   Result Value Ref Range    Glucose (POC) 245 (H) 70 - 712 mg/dL   METABOLIC PANEL, BASIC    Collection Time: 04/17/20  4:30 AM   Result Value Ref Range    Sodium 144 136 - 145 mmol/L    Potassium 5.0 3.5 - 5.5 mmol/L    Chloride 113 (H) 100 - 111 mmol/L    CO2 26 21 - 32 mmol/L    Anion gap 5 3.0 - 18 mmol/L    Glucose 144 (H) 74 - 99 mg/dL    BUN 13 7.0 - 18 MG/DL    Creatinine 0.68 0.6 - 1.3 MG/DL    BUN/Creatinine ratio 19 12 - 20      GFR est AA >60 >60 ml/min/1.73m2    GFR est non-AA >60 >60 ml/min/1.73m2    Calcium 7.2 (L) 8.5 - 10.1 MG/DL   CBC W/O DIFF    Collection Time: 04/17/20  4:30 AM   Result Value Ref Range    WBC 6.2 4.6 - 13.2 K/uL    RBC 2.44 (L) 4.70 - 5.50 M/uL    HGB 7.3 (L) 13.0 - 16.0 g/dL    HCT 22.3 (L) 36.0 - 48.0 %    MCV 91.4 74.0 - 97.0 FL    MCH 29.9 24.0 - 34.0 PG    MCHC 32.7 31.0 - 37.0 g/dL    RDW 13.7 11.6 - 14.5 %    PLATELET 571 956 - 506 K/uL    MPV 10.2 9.2 - 11.8 FL   GLUCOSE, POC    Collection Time: 04/17/20  7:18 AM   Result Value Ref Range    Glucose (POC) 160 (H) 70 - 110 mg/dL   GLUCOSE, POC    Collection Time: 04/17/20 10:56 AM   Result Value Ref Range Glucose (POC) 206 (H) 70 - 110 mg/dL   GLUCOSE, POC    Collection Time: 04/17/20  4:52 PM   Result Value Ref Range    Glucose (POC) 119 (H) 70 - 110 mg/dL       ASSESSMENT  Post removal of  instrumentation and explore fusion l3-l5, decompression l2-l3, l5-s1, smith funes osteotomy, T12-L1, L1-2, l3-l4, L5-S1 flospine t10-s1,iliac bolts, jumper rods, PSF [id number]  Metabolic encephalopathy due to analgesics resolved  Expected post op anemia due to acute blood loss  T2DM  HTN  RA  PTA Left foot drop   Chronic anemia probably due to chronic diease anemia (RA)     PLAN  Stat H/H  Latus   Advance diet  CBC in 119 Mouna Bauer MD  4/17/2020, 4:41 PM    CELL [phone]

## 2020-04-17 NOTE — PROGRESS NOTES
0800  Received pt on the  recliner with TLSO, moving all extremities with limitation of the left leg, noted slight shaking of both hands, per MD it is present prior to surgery. 0900  Assisted by PT back to bed with  Walker. Bladder scan done was 145, no  urge to void, given  po fluids. 1730  Voided 2 x post benitez out, no urge to void at this time. 1835 dressing of the back dry and intact, no bleeding noted, seen by  Dr Durell Severin, no dizziness and no headache , H and H to MD, order for stat,  Drawn and sent to lab.    2009  Order for one unit PRBC now, H and H 30 min post first unit transfused, if Hgb is < 7, transfuse second unit of PRBC, draw  H and H    Again 30 minutes post  Second transfusion, if Hgb is < than 7 transfuse  Third  PRBC. Report to incoming RN. Small  Bleeding  From his penis, no bleeding noted in the back dressing.

## 2020-04-17 NOTE — PROGRESS NOTES
Problem: Pressure Injury - Risk of  Goal: *Prevention of pressure injury  Description: Document Hugh Scale and appropriate interventions in the flowsheet. Outcome: Progressing Towards Goal  Note: Pressure Injury Interventions:             Activity Interventions: Increase time out of bed    Mobility Interventions: HOB 30 degrees or less    Nutrition Interventions: Document food/fluid/supplement intake                     Problem: Patient Education: Go to Patient Education Activity  Goal: Patient/Family Education  Outcome: Progressing Towards Goal

## 2020-04-17 NOTE — PROGRESS NOTES
1930  Bedside, Verbal, and Written shift change report given to [address] [place] [address] (oncoming nurse) by Silvio Muñiz (offgoing nurse). Report included the following information SBAR, Kardex, and MAR.     2000  Patient is in bed, alert and oriented but sleepy. Oxygen on, IV CDI, dressing CDI, no sign of distress. Complains of pain. Bed low, call bell within reach. 2100  Patient is in bed, sleeping. No sign of distress. Bed low, call bell within reach. 0000  Patient is in bed, sleeping. Oxygen on, IV CDI, dressing CDI, no sign of distress. Bed low, call bell within reach. 0300  Patient is in bed, resting/ sleeping. No sign of distress. Bed low, call bell within reach. 0400  Patient is in bed, alert and oriented. Oxygen on, IV CDI, dressing CDI, no sign of distress. Bed low, call bell within reach. 0500  D/c benitez per order. Patient is sitting on the recliner with his brace on.     0730  Bedside, Verbal, and Written shift change report given to Maya Palacios RN (oncoming nurse) by [address] [place] [address] (offgoing nurse). Report included the following information SBAR, Kardex, and MAR.

## 2020-04-17 NOTE — ROUTINE PROCESS
0500- Patient assisted up to chair with assistx1 with PT with back brace on. Left in comfortable position with call bell and belongings in reach, encouraged to call for assistance, voiced understanding.

## 2020-04-17 NOTE — PROGRESS NOTES
Reason for Admission:  Lumbar stenosis                    RUR Score:     9%                Plan for utilizing home health:  CM to follow and assess        PCP: First and Last name:  Carmelina Salgado   Name of Practice: [facility]   Are you a current patient: Yes/No: yes   Approximate date of last visit: last week                    Current Advanced Directive/Advance Care Plan: On file. Medical decision maker is his mother, Alyse Ochoa ([phone]) Confirmed with pt that medical decision maker has not changed. Transition of Care Plan:     Ion Valencia pt and verified all face sheet info. Pt states he lives with \"relatives\" in a two story home. Reports that he is independent with ADL's. States he has two canes at home and a \"body brace\". The pt does drive and is able to obtain needed meds through [facility]. His plan is to return home when medically ready and states his mother Beka Ray will provide transport. Patient has designated ____mother____________________ to participate in his/her discharge plan and to receive any needed information. Name: Alyse Ochoa  Address:  Phone number: [phone]    Care Management Interventions  PCP Verified by CM:  Yes  Last Visit to PCP: 03/17/20  Mode of Transport at Discharge: Self(daughter will provide transport)  Transition of Care Consult (CM Consult): Discharge Planning  Current Support Network: Relative's Home(lives with \"relatives\")  Confirm Follow Up Transport: Self  Discharge Location  Discharge Placement: Other:(home with possible home health)

## 2020-04-17 NOTE — PROGRESS NOTES
Problem: Discharge Planning  Goal: *Discharge to safe environment  Outcome: Progressing Towards Goal   Plan: home with possible home health

## 2020-04-17 NOTE — PROGRESS NOTES
Problem: Pressure Injury - Risk of  Goal: *Prevention of pressure injury  Description: Document Hugh Scale and appropriate interventions in the flowsheet. Outcome: Progressing Towards Goal  Note: Pressure Injury Interventions: Activity Interventions: Increase time out of bed    Mobility Interventions: HOB 30 degrees or less, Pressure redistribution bed/mattress (bed type), PT/OT evaluation    Nutrition Interventions: Document food/fluid/supplement intake, Offer support with meals,snacks and hydration    Friction and Shear Interventions: Minimize layers, HOB 30 degrees or less                Problem: Patient Education: Go to Patient Education Activity  Goal: Patient/Family Education  Outcome: Progressing Towards Goal     Problem: Falls - Risk of  Goal: *Absence of Falls  Description: Document Robert Fall Risk and appropriate interventions in the flowsheet.   Outcome: Progressing Towards Goal  Note: Fall Risk Interventions:  Mobility Interventions: Patient to call before getting OOB         Medication Interventions: Evaluate medications/consider consulting pharmacy, Patient to call before getting OOB, Assess postural VS orthostatic hypotension    Elimination Interventions: Call light in reach, Toileting schedule/hourly rounds, Urinal in reach, Toilet paper/wipes in reach              Problem: Patient Education: Go to Patient Education Activity  Goal: Patient/Family Education  Outcome: Progressing Towards Goal     Problem: Patient Education: Go to Patient Education Activity  Goal: Patient/Family Education  Outcome: Progressing Towards Goal     Problem: Discharge Planning  Goal: *Discharge to safe environment  Outcome: Progressing Towards Goal

## 2020-04-18 LAB
ABO + RH BLD: NORMAL
BASOPHILS # BLD: 0 K/UL (ref 0–0.1)
BASOPHILS # BLD: 0 K/UL (ref 0–0.1)
BASOPHILS NFR BLD: 0 % (ref 0–2)
BASOPHILS NFR BLD: 0 % (ref 0–2)
BLD PROD TYP BPU: NORMAL
BLOOD GROUP ANTIBODIES SERPL: NORMAL
BPU ID: NORMAL
CALLED TO:,BCALL1: NORMAL
CROSSMATCH RESULT,%XM: NORMAL
DIFFERENTIAL METHOD BLD: ABNORMAL
DIFFERENTIAL METHOD BLD: ABNORMAL
EOSINOPHIL # BLD: 0 K/UL (ref 0–0.4)
EOSINOPHIL # BLD: 0 K/UL (ref 0–0.4)
EOSINOPHIL NFR BLD: 0 % (ref 0–5)
EOSINOPHIL NFR BLD: 0 % (ref 0–5)
ERYTHROCYTE [DISTWIDTH] IN BLOOD BY AUTOMATED COUNT: 14 % (ref 11.6–14.5)
ERYTHROCYTE [DISTWIDTH] IN BLOOD BY AUTOMATED COUNT: 14.7 % (ref 11.6–14.5)
GLUCOSE BLD STRIP.AUTO-MCNC: 104 MG/DL (ref 70–110)
GLUCOSE BLD STRIP.AUTO-MCNC: 126 MG/DL (ref 70–110)
GLUCOSE BLD STRIP.AUTO-MCNC: 138 MG/DL (ref 70–110)
GLUCOSE BLD STRIP.AUTO-MCNC: 139 MG/DL (ref 70–110)
GLUCOSE BLD STRIP.AUTO-MCNC: 171 MG/DL (ref 70–110)
HCT VFR BLD AUTO: 23.1 % (ref 36–48)
HCT VFR BLD AUTO: 23.9 % (ref 36–48)
HCT VFR BLD AUTO: 24.3 % (ref 36–48)
HGB BLD-MCNC: 7.7 G/DL (ref 13–16)
HGB BLD-MCNC: 7.7 G/DL (ref 13–16)
HGB BLD-MCNC: 8.1 G/DL (ref 13–16)
LYMPHOCYTES # BLD: 0.6 K/UL (ref 0.9–3.6)
LYMPHOCYTES # BLD: 0.6 K/UL (ref 0.9–3.6)
LYMPHOCYTES NFR BLD: 7 % (ref 21–52)
LYMPHOCYTES NFR BLD: 8 % (ref 21–52)
MCH RBC QN AUTO: 29.4 PG (ref 24–34)
MCH RBC QN AUTO: 30.1 PG (ref 24–34)
MCHC RBC AUTO-ENTMCNC: 32.2 G/DL (ref 31–37)
MCHC RBC AUTO-ENTMCNC: 33.3 G/DL (ref 31–37)
MCV RBC AUTO: 90.2 FL (ref 74–97)
MCV RBC AUTO: 91.2 FL (ref 74–97)
MONOCYTES # BLD: 0.9 K/UL (ref 0.05–1.2)
MONOCYTES # BLD: 0.9 K/UL (ref 0.05–1.2)
MONOCYTES NFR BLD: 11 % (ref 3–10)
MONOCYTES NFR BLD: 11 % (ref 3–10)
NEUTS SEG # BLD: 6.7 K/UL (ref 1.8–8)
NEUTS SEG # BLD: 6.9 K/UL (ref 1.8–8)
NEUTS SEG NFR BLD: 81 % (ref 40–73)
NEUTS SEG NFR BLD: 82 % (ref 40–73)
PLATELET # BLD AUTO: 168 K/UL (ref 135–420)
PLATELET # BLD AUTO: 172 K/UL (ref 135–420)
PMV BLD AUTO: 10.3 FL (ref 9.2–11.8)
PMV BLD AUTO: 9.6 FL (ref 9.2–11.8)
RBC # BLD AUTO: 2.56 M/UL (ref 4.7–5.5)
RBC # BLD AUTO: 2.62 M/UL (ref 4.7–5.5)
SPECIMEN EXP DATE BLD: NORMAL
STATUS OF UNIT,%ST: NORMAL
UNIT DIVISION, %UDIV: 0
WBC # BLD AUTO: 8.2 K/UL (ref 4.6–13.2)
WBC # BLD AUTO: 8.5 K/UL (ref 4.6–13.2)

## 2020-04-18 PROCEDURE — 36415 COLL VENOUS BLD VENIPUNCTURE: CPT

## 2020-04-18 PROCEDURE — 85025 COMPLETE CBC W/AUTO DIFF WBC: CPT

## 2020-04-18 PROCEDURE — 85018 HEMOGLOBIN: CPT

## 2020-04-18 PROCEDURE — 74011250636 HC RX REV CODE- 250/636: Performed by: INTERNAL MEDICINE

## 2020-04-18 PROCEDURE — 65270000029 HC RM PRIVATE

## 2020-04-18 PROCEDURE — 77010033678 HC OXYGEN DAILY

## 2020-04-18 PROCEDURE — 74011250637 HC RX REV CODE- 250/637: Performed by: INTERNAL MEDICINE

## 2020-04-18 PROCEDURE — 97530 THERAPEUTIC ACTIVITIES: CPT

## 2020-04-18 PROCEDURE — 74011250636 HC RX REV CODE- 250/636: Performed by: ORTHOPAEDIC SURGERY

## 2020-04-18 PROCEDURE — 82962 GLUCOSE BLOOD TEST: CPT

## 2020-04-18 PROCEDURE — 74011636637 HC RX REV CODE- 636/637: Performed by: INTERNAL MEDICINE

## 2020-04-18 RX ADMIN — Medication 10 ML: at 21:44

## 2020-04-18 RX ADMIN — OXYCODONE HYDROCHLORIDE AND ACETAMINOPHEN 2 TABLET: 10; 325 TABLET ORAL at 06:35

## 2020-04-18 RX ADMIN — INSULIN GLARGINE 10 UNITS: 100 INJECTION, SOLUTION SUBCUTANEOUS at 21:43

## 2020-04-18 RX ADMIN — Medication 5 ML: at 06:00

## 2020-04-18 RX ADMIN — SODIUM CHLORIDE AND POTASSIUM CHLORIDE: 9; 1.49 INJECTION, SOLUTION INTRAVENOUS at 06:35

## 2020-04-18 RX ADMIN — OXYCODONE HYDROCHLORIDE AND ACETAMINOPHEN 2 TABLET: 10; 325 TABLET ORAL at 21:42

## 2020-04-18 RX ADMIN — SODIUM CHLORIDE AND POTASSIUM CHLORIDE 75 ML: 9; 1.49 INJECTION, SOLUTION INTRAVENOUS at 18:02

## 2020-04-18 RX ADMIN — OXYCODONE HYDROCHLORIDE AND ACETAMINOPHEN 2 TABLET: 10; 325 TABLET ORAL at 13:13

## 2020-04-18 RX ADMIN — INSULIN LISPRO 2 UNITS: 100 INJECTION, SOLUTION INTRAVENOUS; SUBCUTANEOUS at 12:18

## 2020-04-18 RX ADMIN — Medication 10 ML: at 14:00

## 2020-04-18 NOTE — PROGRESS NOTES
Problem: Pressure Injury - Risk of  Goal: *Prevention of pressure injury  Description: Document Hugh Scale and appropriate interventions in the flowsheet. Outcome: Progressing Towards Goal  Note: Pressure Injury Interventions: Activity Interventions: Pressure redistribution bed/mattress(bed type)    Mobility Interventions: HOB 30 degrees or less, Pressure redistribution bed/mattress (bed type), PT/OT evaluation    Nutrition Interventions: Document food/fluid/supplement intake    Friction and Shear Interventions: HOB 30 degrees or less, Foam dressings/transparent film/skin sealants                Problem: Patient Education: Go to Patient Education Activity  Goal: Patient/Family Education  Outcome: Progressing Towards Goal     Problem: Falls - Risk of  Goal: *Absence of Falls  Description: Document Robert Fall Risk and appropriate interventions in the flowsheet.   Outcome: Progressing Towards Goal  Note: Fall Risk Interventions:  Mobility Interventions: Patient to call before getting OOB, Utilize walker, cane, or other assistive device, Utilize gait belt for transfers/ambulation         Medication Interventions: Assess postural VS orthostatic hypotension, Evaluate medications/consider consulting pharmacy, Teach patient to arise slowly    Elimination Interventions: Call light in reach, Patient to call for help with toileting needs, Toilet paper/wipes in reach, Toileting schedule/hourly rounds              Problem: Patient Education: Go to Patient Education Activity  Goal: Patient/Family Education  Outcome: Progressing Towards Goal     Problem: Patient Education: Go to Patient Education Activity  Goal: Patient/Family Education  Outcome: Progressing Towards Goal     Problem: Discharge Planning  Goal: *Discharge to safe environment  Outcome: Progressing Towards Goal

## 2020-04-18 NOTE — PROGRESS NOTES
POD 2  Postoperative Diagnosis: L/S stenosis m43.16       Procedure: Procedure(s):  Remove instrumentation and explore fusion l3-l5, decompression l2-l3, l5-s1, smith funes osteotomy, T12-L1, L1-2, l3-l4, L5-S1 flospine t10-s1,iliac bolts, jumper rods, PSF T10-S1          HPI:   Uneventful night  PRBC x 1 transfused last night for drop in H/H 5.6/17.3; post transfusion H/H 7.7/23.1; this AM H/H 7.7/23.9  Post op pain controlled, no leg pain. No numbness; PTA left foot drop unchanged  No CP SOB  No N/V; passing flatus  Voiding well    ACTIVE MEDICAL PROBLEMS/PMH/PSH    Patient Active Problem List   Diagnosis Code    Lumbar stenosis M48.061    Lumbar radicular pain M54.16     PMH:  Past Medical History:   Diagnosis Date    Back pain     Diabetes ([facility] [address]     5.7 A1C    GERD (gastroesophageal reflux disease)     Hypertension     Imbalance     Kidney stone     Numbness and tingling of both lower extremities     Rheumatoid arthritis (HCC)     Weakness of both legs      LESI x 3 Dr. Erin Amezcua  Cervical radiculopathy  Left Hip OA  Left foot drop  Lumbar disc narrowing  Lumbar spinal stenosis  Compression Fracture L1 vertebrate  Sensorineural hearing loss, asymmetrical    PSH:  Past Surgical History:   Procedure Laterality Date    HX BACK SURGERY  03/02/2018    HX CATARACT REMOVAL Bilateral     HX CERVICAL FUSION      HX HERNIA REPAIR      HX LAP CHOLECYSTECTOMY      HX OTHER SURGICAL      left arn ulnar nerve repair    HX ROTATOR CUFF REPAIR Left      Lumbar decompression L3-L5 03/15  Decompression and Fusion  L3-L5 05/18    PTA MEDICATIONS  Medications Prior to Admission   Medication Sig    glimepiride (AmaryL) 1 mg tablet Take 1 mg by mouth two (2) times a day.  pantoprazole (Protonix) 40 mg tablet Take 40 mg by mouth daily.  diclofenac (VOLTAREN) 1 % gel     ferrous sulfate (SLOW FE) 142 mg (45 mg iron) ER tablet Take 142 mg by mouth every other day.     lisinopril (PRINIVIL, ZESTRIL) 5 mg tablet Take 5 mg by mouth daily.  calcium-cholecalciferol, d3, (CALCIUM 600 + D) 600-125 mg-unit tab Take  by mouth. Indications: TWICE DAILY    leflunomide (ARAVA) 20 mg tablet Take 20 mg by mouth daily.  TOFACITINIB CITRATE (XELJANZ PO) Take 5 mg by mouth two (2) times a day. Indications: TWICE DAILY       ALLERGIES:    Allergies   Allergen Reactions    Aspirin Other (comments)     Large doses stomach upset. Can tolerate baby aspirin     Bactrim [Sulfamethoprim] Rash    Gabapentin Other (comments)     Memory affected.  Paxil [Paroxetine Hcl] Unknown (comments)    Serotonin 5ht-3 Antagonists Other (comments)     Not allergic . Wrong drug.  Sulfa (Sulfonamide Antibiotics) Rash        FAMILY HISTORY   Father:  Mother:  Brothers:  Sisters :  Children    Family History   Problem Relation Age of Onset    Diabetes Mother     Heart Disease Father     Cancer Sister     Stroke Sister     Heart Disease Brother        SOCIAL HISTORY  Marital Status:  Education Completed:  Occupation:  Tobacco use: no  Alcohol use: no  Drug use: no  STI: no  Living will:   POA:     Social History     Socioeconomic History    Marital status: SINGLE     Spouse name: Not on file    Number of children: Not on file    Years of education: Not on file    Highest education level: Not on file   Occupational History    Not on file   Social Needs    Financial resource strain: Not on file    Food insecurity     Worry: Not on file     Inability: Not on file   [employer] needs     Medical: Not on file     Non-medical: Not on file   Tobacco Use    Smoking status: Former Smoker     Last attempt to quit: 1978     Years since quittin.3    Smokeless tobacco: Never Used    Tobacco comment: quit 38 years ago.    Substance and Sexual Activity    Alcohol use: No    Drug use: Never    Sexual activity: Not on file   Lifestyle    Physical activity     Days per week: Not on file     Minutes per session: Not on file    Stress: Not on file   Relationships    Social connections     Talks on phone: Not on file     Gets together: Not on file     Attends Spiritism service: Not on file     Active member of club or organization: Not on file     Attends meetings of clubs or organizations: Not on file     Relationship status: Not on file    Intimate partner violence     Fear of current or ex partner: Not on file     Emotionally abused: Not on file     Physically abused: Not on file     Forced sexual activity: Not on file   Other Topics Concern    Not on file   Social History Narrative    Not on file       ROS:  Constitutional:  No headache. Cardiac: HPI  Respiratory: No cough  GI: HPI  : HPI  Skin: No rash. No itching      Physical Exam:      Visit Vitals  /58 (BP 1 Location: Right arm, BP Patient Position: At rest)   Pulse (!) 109   Temp 98.7 °F (37.1 °C)   Resp 18   Ht 5' 7\" (1.702 m)   Wt 68 kg (150 lb)   SpO2 96%   BMI 23.49 kg/m²     GENERAL: Awake Alert NAD  LUNGS: CTA. BS Normal Bilaterally  HEART: tachycardic 110/min   Normal S1 S2. 1-2/6 MRM. No S3 S4  ABDOMEN: Soft. Normal BS. No tenderness  LE: pedal edema decreased.  SCD in place  PULSES: Radial 2+ B/L; PT 1+ B/L    Dressing some old drainage; dressing reinforced per nursing staff    NEUROLOGIC:     Oriented X 4  Muscle Strength  RLE: quads 5/5 AT 5/5 GCS 5/5  LLE: quads 5/5 dorsiflexion 2/5 plantar flexion 5/5 GCS 5/5    Labs Reviewed:    Recent Results (from the past 24 hour(s))   GLUCOSE, POC    Collection Time: 04/17/20 10:56 AM   Result Value Ref Range    Glucose (POC) 206 (H) 70 - 110 mg/dL   GLUCOSE, POC    Collection Time: 04/17/20  4:52 PM   Result Value Ref Range    Glucose (POC) 119 (H) 70 - 110 mg/dL   HGB & HCT    Collection Time: 04/17/20  6:01 PM   Result Value Ref Range    HGB 7.2 (L) 13.0 - 16.0 g/dL    HCT 21.9 (L) 36.0 - 48.0 %   HGB & HCT    Collection Time: 04/17/20  6:30 PM   Result Value Ref Range    HGB 5.6 (LL) 13.0 - 16.0 g/dL    HCT 17.3 (LL) 36.0 - 48.0 %   GLUCOSE, POC    Collection Time: 04/17/20  9:46 PM   Result Value Ref Range    Glucose (POC) 151 (H) 70 - 110 mg/dL   CBC WITH AUTOMATED DIFF    Collection Time: 04/18/20  1:48 AM   Result Value Ref Range    WBC 8.2 4.6 - 13.2 K/uL    RBC 2.56 (L) 4.70 - 5.50 M/uL    HGB 7.7 (L) 13.0 - 16.0 g/dL    HCT 23.1 (L) 36.0 - 48.0 %    MCV 90.2 74.0 - 97.0 FL    MCH 30.1 24.0 - 34.0 PG    MCHC 33.3 31.0 - 37.0 g/dL    RDW 14.0 11.6 - 14.5 %    PLATELET 672 602 - 245 K/uL    MPV 9.6 9.2 - 11.8 FL    NEUTROPHILS 81 (H) 40 - 73 %    LYMPHOCYTES 8 (L) 21 - 52 %    MONOCYTES 11 (H) 3 - 10 %    EOSINOPHILS 0 0 - 5 %    BASOPHILS 0 0 - 2 %    ABS. NEUTROPHILS 6.7 1.8 - 8.0 K/UL    ABS. LYMPHOCYTES 0.6 (L) 0.9 - 3.6 K/UL    ABS. MONOCYTES 0.9 0.05 - 1.2 K/UL    ABS. EOSINOPHILS 0.0 0.0 - 0.4 K/UL    ABS. BASOPHILS 0.0 0.0 - 0.1 K/UL    DF AUTOMATED     CBC WITH AUTOMATED DIFF    Collection Time: 04/18/20  7:25 AM   Result Value Ref Range    WBC 8.5 4.6 - 13.2 K/uL    RBC 2.62 (L) 4.70 - 5.50 M/uL    HGB 7.7 (L) 13.0 - 16.0 g/dL    HCT 23.9 (L) 36.0 - 48.0 %    MCV 91.2 74.0 - 97.0 FL    MCH 29.4 24.0 - 34.0 PG    MCHC 32.2 31.0 - 37.0 g/dL    RDW 14.7 (H) 11.6 - 14.5 %    PLATELET 200 902 - 486 K/uL    MPV 10.3 9.2 - 11.8 FL    NEUTROPHILS 82 (H) 40 - 73 %    LYMPHOCYTES 7 (L) 21 - 52 %    MONOCYTES 11 (H) 3 - 10 %    EOSINOPHILS 0 0 - 5 %    BASOPHILS 0 0 - 2 %    ABS. NEUTROPHILS 6.9 1.8 - 8.0 K/UL    ABS. LYMPHOCYTES 0.6 (L) 0.9 - 3.6 K/UL    ABS. MONOCYTES 0.9 0.05 - 1.2 K/UL    ABS. EOSINOPHILS 0.0 0.0 - 0.4 K/UL    ABS.  BASOPHILS 0.0 0.0 - 0.1 K/UL    DF AUTOMATED     GLUCOSE, POC    Collection Time: 04/18/20  7:50 AM   Result Value Ref Range    Glucose (POC) 104 70 - 110 mg/dL     ASSESSMENT  Post removal of  instrumentation and explore fusion l3-l5, decompression l2-l3, l5-s1, smith funes osteotomy, T12-L1, L1-2, l3-l4, L5-S1 flospine t10-s1,iliac bolts, jumper rods, PSF T10-S1  Expected post op anemia due to acute blood loss  H/H stable post 1 unit PRBC  T2DM BS better controlled  HTN stable  RA  PTA Left foot drop   Chronic anemia probably due to chronic diease anemia (RA)   Metabolic encephalopathy due to analgesics resolved    PLAN  Continue same meds/POC  Monitor H/H  Advance diet  Decrease IVF    Discussed with nursing staff    Thomas Marshall MD  4/18/2020, 10:00 AM    CELL [phone]

## 2020-04-18 NOTE — PROGRESS NOTES
Bedside report given by Yee Brewer RN. Pt in bed resting alert and oriented x4 denies pain at the moment. Assessement completed. Pt has incision on the back dressing CDI small blister noted just besides the dressing. Pt encouraged to use IS. Plan of care for the shift explained pt verbalized understanding. IVF infusing well, HOB elevated, bed low and in locked position. Call light and frequently used items within reach. 2142- Pt medicated for pain 5/10 on the back with 2 percocet po- will reassess. 2240- Pt stated pain much better 2/10.  2300- Pt given a bed bath made comfortable in bed. Dressings on the back CDI , mepilex applied on the 2 small blisters noted on the left and right side of the dressings. 0100- Pt sleeping no signs of distress noted. IVF infusing well.  0300-  Pt sleeping. 0410- Pt incontinent of large amount of urine,  Pt stated that it happened when sleeping and so he had to go. pt educated on the use condom catheter to help  protect the incision site from contamination with urine- pt accepted. Condom catheter placed. 3682- Pt sleeping  Uneventful night. IVF infusing well, HOB elevated, bed low and in locked position call light within within reach. 6801- Bedside and Verbal shift change report given to Bettie Villanueva (oncoming nurse) by Lester Maradiaga RN (offgoing nurse). Report included the following information SBAR, Kardex, Intake/Output, MAR and Recent Results.

## 2020-04-18 NOTE — ROUTINE PROCESS
[address] care of pt report received from  Saul Escobedo RN. Pt awake, alert and oriented X 3. C/o back pain. Back dressing C/D/I. Bed in lowest position & wheels locked. Call bell within reach. 2008 - 2 tabs of Percocet given for pain. 2100 - Blood transfusion started. VSS. Denies sob and distress. Hgb 5.6 / Hct 17.0    2200 -  Pt in bed resting blood transfusion infusing well, pt denies distress. 4/18/2020    0025 - Blood transfusion completed without s/s of reactions. Vitals :  /62   Temp 98.2  RR 16 sat 94% on RA. Pt denies distress. 0148 -  Hgb 7.7 / Hct 23.1    0745 - Bedside and Verbal shift change report given to Michael Rodriguez RN (oncoming nurse) by Bolivar Christiansen RN BSN ( off going Nurse ) inclusive of SBAR and plan of care, Intake & Output.

## 2020-04-18 NOTE — PROGRESS NOTES
Problem: Mobility Impaired (Adult and Pediatric)  Goal: *Acute Goals and Plan of Care (Insert Text)  Description: Physical Therapy Goals  Initiated 4/17/2020 and to be accomplished within 7 day(s)  1. Patient will move from supine to sit and sit to supine  in bed with modified independence. 2.  Patient will transfer from bed to chair and chair to bed with modified independence using the least restrictive device. 3.  Patient will perform sit to stand with modified independence. 4.  Patient will ambulate with independence for 50 feet with the least restrictive device. 5.  Patient will ascend/descend 5 stairs with handrail(s) with modified independence. Outcome: Progressing Towards Goal     PHYSICAL THERAPY TREATMENT    Patient: Cassia Garg (34 y.o. male)  Date: 4/18/2020  Diagnosis: Lumbar stenosis [M48.061]   <principal problem not specified>  Procedure(s) (LRB):  Remove instrumentation and explore fusion l3-l5, decompression l2-l3, l5-s1, smith funes osteotomy, l1-l2, l3-l4, flospine t10-s1,iliac bolts, jumper rods (N/A) 2 Days Post-Op  Precautions: Fall, TLSO, Lumbar Precaution  PLOF: Independent in home; Modified I with cane in community    ASSESSMENT:  Supine in bed on initial contact. Min Assist to log roll to his right. Mod/max Assist to complete supine to sit and mod assist to scoot to edge of bed. Dependent in donning TLSO sitting edge of bed. Mod Assist sit to stand. 4 attempts before patient able to come to a full stand. Min/Mod Assist stand pivot to chair taking 2-3 short steps. Left up in chair at end of treatment session. Nurse Shahriar Delgadillo aware. Patient required increase time to complete task at hand secondary to c/o pain with functional mobility.   Progression toward goals:   []      Improving appropriately and progressing toward goals  [x]      Improving slowly and progressing toward goals  []      Not making progress toward goals and plan of care will be adjusted PLAN:  Patient continues to benefit from skilled intervention to address the above impairments. Continue treatment per established plan of care. Discharge Recommendations:  Jw Mc  Further Equipment Recommendations for Discharge:  rolling walker     SUBJECTIVE:   Patient stated I prefer to use a cane.     OBJECTIVE DATA SUMMARY:   Critical Behavior:  Neurologic State: Alert  Orientation Level: Oriented X4  Cognition: Appropriate for age attention/concentration  Safety/Judgement: Awareness of environment, Fall prevention  Functional Mobility Training:  Bed Mobility:  Rolling: Minimum assistance  Supine to Sit: Moderate assistance;Maximum assistance  Scooting: Moderate assistance(scoot to edge of bed in sitting)  Transfers:  Sit to Stand: Moderate assistance; Additional time;Assist x1(attempted 4x before able to come to a full stand)  Stand to Sit: Minimum assistance; Additional time  Bed to Chair: Minimum assistance; Moderate assistance; Additional time(stand pivot transfer taking 2-3 short steps)  Balance:  Sitting: Impaired  Sitting - Static: Fair (occasional)  Sitting - Dynamic: Fair (occasional)(Minus/Poor +)  Standing: Impaired  Standing - Static: Fair(Minus)  Standing - Dynamic : Poor   Ambulation/Gait Training:  Distance (ft): (just stood)  Assistive Device: Walker, rolling  Ambulation - Level of Assistance: Minimal assistance; Moderate assistance     Pain:  Pain level pre-treatment: 5/10, low back  Pain level post-treatment: 5/10, low back  Pain Intervention(s): Medication (see MAR); Rest, Ice, Repositioning  Response to intervention: Nurse notified, See doc flow    Activity Tolerance:   Fair Minus    Please refer to the flowsheet for vital signs taken during this treatment.   After treatment:   [x] Patient left in no apparent distress sitting up in chair  [] Patient left in no apparent distress in bed  [x] Call bell left within reach  [x] Nursing notified  [] Caregiver present  [] Bed alarm activated  [] SCDs applied      COMMUNICATION/EDUCATION:   []         Role of Physical Therapy in the acute care setting. []         Fall prevention education was provided and the patient/caregiver indicated understanding. [x]         Patient/family have participated as able in working toward goals and plan of care. []         Patient/family agree to work toward stated goals and plan of care. []         Patient understands intent and goals of therapy, but is neutral about his/her participation.   []         Patient is unable to participate in stated goals/plan of care: ongoing with therapy staff.  []         Other:        Yuliana Jacobo, PT   Time Calculation: 39 mins

## 2020-04-18 NOTE — PROGRESS NOTES
Progress Note    Patient: Columba Born MRN: [id number]  SSN: [id number]    YOB: 1945  Age: 76 y.o. Sex: male      Admit Date: 4/16/2020    LOS: 2 days     Subjective:     Columba Born is a 77 yo M c/o low back pain. Pt reports numbness over dorsal foot has improved. Has no new ortho complaints. Objective:     Vitals:    04/18/20 0025 04/18/20 0151 04/18/20 0500 04/18/20 0849   BP: 112/62 107/61 124/65 114/58   Pulse: (!) 112 (!) 114 (!) 113 (!) 109   Resp: 16 16 18 18   Temp: 98.2 °F (36.8 °C) 97.4 °F (36.3 °C) 98.3 °F (36.8 °C) 98.7 °F (37.1 °C)   SpO2: 94% 95% 94% 96%   Weight:       Height:            Intake and Output:  Current Shift: 04/18 0701 - 04/18 1900  In: 120 [P.O.:120]  Out: 300 [Urine:300]  Last three shifts: 04/16 1901 - 04/18 0700  In: 4478.3 [P.O.:1260; I.V.:2908.3]  Out: 1255 [Urine:1255]    Physical Exam:   GENERAL: alert, fatigued, cooperative, no distress, appears stated age  EXTREMITIES:  Extremities atraumatic, no cyanosis or edema. Calves soft and NT. Sensation to light touch intact. 2+ DP bilaterally. Strength in LE symmetrical and appropriate s/p sx. NEUROLOGIC: AOx3. SKIN: Dressing CDI. Back brace on. PSYCHIATRIC: non focal, happy    Lab/Data Review: All lab results for the last 24 hours reviewed. No new ortho imaging today. Assessment:     1.  Patient is s/p REMOVE SPINE FIX DEV,POST SGMTAL [00682] (SPINE LUMBAR POSTERIOR INTERBODY FUSION (PLIF))  EXPLORATION OF SPINAL FUSION [01683]  LAMINEC/FACETECT/FORAMIN,LUMBAR [52501]  LAMINEC/FACETECT/FORAMIN,EACH ADDNL [31579]  KS ARTHDSIS POST/POSTEROLATRL/POSTINTERBODY LUMBAR [72228]  KS ARTHDSIS POST/POSTERLATRL/POSTINTRBDYADL SPC/SEG [61985]  OSTEOTOMY LUMB SP,POST,1 LVL [35652]  OSTEOTOMY,POST,EA ADDN SGMT [65545]  INSERT VERT FIX DEV,POST,7-12 SGMTS [81208]  INSERT PELVIC FIXATION DEVICE [95409]  KS OSTEOTOMY SPINE POSTERIOR 3 COLUMN LUMBAR [22207]  KS ALLOGRAFT FOR SPINE SURGERY ONLY STRUCTURAL [75979] for Lumbar stenosis [M48.061]     Plan:     1. Mobilize. Cont PT. 2. Pain management. 3. Discharge planning to SNF when medically clear. Pt needs to make more progress in PT.     Signed By: BROOKS Davis     April 18, 2020

## 2020-04-18 NOTE — PROGRESS NOTES
Bedside shift report received from Kelsy with deniaar  Instructed on using .   oob to chair  Dr. Yoshi Gonzalez in to see patient  1620 h and h drawn  Bedside shift report given to Alysa Raines with sbar

## 2020-04-19 LAB
BASOPHILS # BLD: 0 K/UL (ref 0–0.1)
BASOPHILS NFR BLD: 0 % (ref 0–2)
DIFFERENTIAL METHOD BLD: ABNORMAL
EOSINOPHIL # BLD: 0.1 K/UL (ref 0–0.4)
EOSINOPHIL NFR BLD: 1 % (ref 0–5)
ERYTHROCYTE [DISTWIDTH] IN BLOOD BY AUTOMATED COUNT: 14.5 % (ref 11.6–14.5)
GLUCOSE BLD STRIP.AUTO-MCNC: 115 MG/DL (ref 70–110)
GLUCOSE BLD STRIP.AUTO-MCNC: 115 MG/DL (ref 70–110)
GLUCOSE BLD STRIP.AUTO-MCNC: 130 MG/DL (ref 70–110)
GLUCOSE BLD STRIP.AUTO-MCNC: 137 MG/DL (ref 70–110)
HCT VFR BLD AUTO: 24.9 % (ref 36–48)
HGB BLD-MCNC: 8.2 G/DL (ref 13–16)
LYMPHOCYTES # BLD: 0.4 K/UL (ref 0.9–3.6)
LYMPHOCYTES NFR BLD: 5 % (ref 21–52)
MCH RBC QN AUTO: 29.9 PG (ref 24–34)
MCHC RBC AUTO-ENTMCNC: 32.9 G/DL (ref 31–37)
MCV RBC AUTO: 90.9 FL (ref 74–97)
MONOCYTES # BLD: 0.9 K/UL (ref 0.05–1.2)
MONOCYTES NFR BLD: 11 % (ref 3–10)
NEUTS SEG # BLD: 7.1 K/UL (ref 1.8–8)
NEUTS SEG NFR BLD: 83 % (ref 40–73)
PLATELET # BLD AUTO: 183 K/UL (ref 135–420)
PMV BLD AUTO: 9.7 FL (ref 9.2–11.8)
RBC # BLD AUTO: 2.74 M/UL (ref 4.7–5.5)
WBC # BLD AUTO: 8.5 K/UL (ref 4.6–13.2)

## 2020-04-19 PROCEDURE — 74011636637 HC RX REV CODE- 636/637: Performed by: INTERNAL MEDICINE

## 2020-04-19 PROCEDURE — 85025 COMPLETE CBC W/AUTO DIFF WBC: CPT

## 2020-04-19 PROCEDURE — 36415 COLL VENOUS BLD VENIPUNCTURE: CPT

## 2020-04-19 PROCEDURE — 74011250636 HC RX REV CODE- 250/636: Performed by: INTERNAL MEDICINE

## 2020-04-19 PROCEDURE — 74011250637 HC RX REV CODE- 250/637: Performed by: INTERNAL MEDICINE

## 2020-04-19 PROCEDURE — 97530 THERAPEUTIC ACTIVITIES: CPT

## 2020-04-19 PROCEDURE — 77030040829 HC CATH EXT URINE MDII -B

## 2020-04-19 PROCEDURE — 65270000029 HC RM PRIVATE

## 2020-04-19 PROCEDURE — 77030040361 HC SLV COMPR DVT MDII -B

## 2020-04-19 PROCEDURE — 82962 GLUCOSE BLOOD TEST: CPT

## 2020-04-19 RX ADMIN — OXYCODONE HYDROCHLORIDE AND ACETAMINOPHEN 1 TABLET: 10; 325 TABLET ORAL at 21:37

## 2020-04-19 RX ADMIN — Medication 10 ML: at 21:39

## 2020-04-19 RX ADMIN — Medication 10 ML: at 06:11

## 2020-04-19 RX ADMIN — OXYCODONE HYDROCHLORIDE AND ACETAMINOPHEN 1 TABLET: 10; 325 TABLET ORAL at 12:38

## 2020-04-19 RX ADMIN — INSULIN GLARGINE 10 UNITS: 100 INJECTION, SOLUTION SUBCUTANEOUS at 21:38

## 2020-04-19 RX ADMIN — OXYCODONE HYDROCHLORIDE AND ACETAMINOPHEN 1 TABLET: 10; 325 TABLET ORAL at 13:30

## 2020-04-19 RX ADMIN — SODIUM CHLORIDE AND POTASSIUM CHLORIDE: 9; 1.49 INJECTION, SOLUTION INTRAVENOUS at 22:13

## 2020-04-19 RX ADMIN — SODIUM CHLORIDE AND POTASSIUM CHLORIDE: 9; 1.49 INJECTION, SOLUTION INTRAVENOUS at 06:11

## 2020-04-19 NOTE — PROGRESS NOTES
Bedside shift report received from Sr. Willis with [facility]  oob with brace to chair  Dr. Sonia Teresa in to see patient  Percocet given for pain  Healing blister on right thigh  Patient assisted back to bed, complaining of pain level of 8, percocet given  Bedside shift report given to Sr. Willis with den[facility]

## 2020-04-19 NOTE — PROGRESS NOTES
1915- Bedside report given by Yee Brewer RN. Pt in bed sleeping easily awakens. Pt states feeling much better pain level at goal 3. Assessment completed. Dressings on the back CDI, old drainage noted on the inner dressing. Pt has pitting edema around the hips and BLE. Plan of care for the shift explained pt verbalized understanding. IVF infusing well, HOB elevated bed low and in locked position  Call light and urinal within reach. Pt,s scrotum noted to be swollen. Pt voiding using urinal denies pain. 2130- Pt incontinent care done bed linen changed pt made comfortable in bed. 2137- Pt medicated for back pain with percocet 1 tab po will reassess. 2236- Pt reassessed - sleeping not in distress. 0030- Pt sleeping. 0400- Reassessment completed - no changes- Will continue with the care. 0530- Pt awake c/o of being sore  Medicated for pain with percocet 1 tab po. Will reassess. 0630- pain 2/10  0725- Bedside and Verbal shift change report given to Aram Harrison RN (oncoming nurse) by Lester Maradiaga RN (offgoing nurse). Report included the following information SBAR, Kardex, Intake/Output, MAR and Recent Results.

## 2020-04-19 NOTE — PROGRESS NOTES
PHYSICAL THERAPY TREATMENT     Patient: Joyce Claros (64 y.o. male)  Date: 4/19/2020  Diagnosis: Lumbar stenosis [M48.061]   <principal problem not specified>  Procedure(s) (LRB):  Remove instrumentation and explore fusion l3-l5, decompression l2-l3, l5-s1, smith funes osteotomy, l1-l2, l3-l4, flospine t10-s1,iliac bolts, jumper rods (N/A) 3 Days Post-Op  Precautions: Fall  PLOF: Independent at home; mod I w/ cane in community    ASSESSMENT:  Patient supine in bed, agreeable to participation with PT. Pt required min to mod A for supine <> sit and required multiple (x4) attempts to complete transfer. Reports increased SOB with supine to sit transfer. Patient is dependent with donning TLSO, and fastening straps. Required min A for scooting EOB in order to prepare for OOB transfer. Elevated HOB and pt. Required min A for sit <> stand. Min A for very slow ambulation x4-6 ft with RW from bed to recliner. Patient takes very small steps when ambulating. Demonstrates fair/poor static/dynamic standing balance and requires min A assist for safety. Required VCing and TC for navigating walker. Patient left sitting in chair with all needs within reach. Patient has no increased c/o pain. Patient educated on safety with mobility and standing/seated exercises. Progression toward goals:        Improving slowly and progressing toward goals       PLAN:  Patient continues to benefit from skilled intervention to address the above impairments. Continue treatment per established plan of care.     EDUCATION:   Education: Patient was educated on the following topics: Safety and proper mechanics with bed mobility, transfers, ADLs, balance, amb, exercise, role of PT, plan of care, cognition, skin integrity, vitals       Barriers to Learning/Limitations: None  Compensate with: visual, verbal, tactile, kinesthetic cues/model    Discharge Recommendations:  Jw Mc  Further Equipment Recommendations for Discharge: brace/splint and rolling walker  Factors which may impact discharge planning: PLOF, Medical Condition, and Pain     SUBJECTIVE:   Patient stated in pain.     OBJECTIVE DATA SUMMARY:   Critical Behavior:  Neurologic State: Alert  Orientation Level: Oriented X4  Cognition: Appropriate decision making  Safety/Judgement: Fall prevention  Functional Mobility Training:  Bed Mobility:  Rolling: Stand-by assistance  Supine to Sit: Moderate assistance; Additional time(4 attempts required to complete transfer)     Scooting: Moderate assistance(scooting to EOB in sitting)    Transfers:  Sit to Stand: Moderate assistance(performed on first attempt)  Stand to Sit: Minimum assistance; Moderate assistance; Additional time        Bed to Chair: Minimum assistance; Moderate assistance    Balance:  Sitting: Impaired  Sitting - Static: Fair (occasional)  Sitting - Dynamic: Fair (occasional)  Standing: Impaired  Standing - Static: Fair  Standing - Dynamic : Poor   Range Of Motion:     Ambulation/Gait Training:  Distance (ft): 4 Feet (ft)  Assistive Device: Walker, rolling  Ambulation - Level of Assistance: Minimal assistance  Gait Abnormalities: Antalgic  Base of Support: Narrowed  Speed/Leidy: Slow  Step Length: Left shortened;Right shortened    Pain:  Pain level pre-treatment: 4-5/10   Pain level post-treatment: 5/10   Pain Intervention(s): Medication (see MAR); Rest  Response to intervention: Nurse notified, See doc flow    Activity Tolerance: Fair   Please refer to the flowsheet for vital signs taken during this treatment. After treatment:   [x] Patient left in no apparent distress sitting up in chair  [] Patient left in no apparent distress in bed  [x] Call bell left within reach  [x] Nursing notified - Mary Standard  [] Caregiver present  [] Bed alarm activated  [] SCDs applied      COMMUNICATION/EDUCATION:   [x]         Role of Physical Therapy in the acute care setting.   [x]         Fall prevention education was provided and the patient/caregiver indicated understanding. [x]         Patient/family have participated as able and agree with findings and recommendations. []         Patient is unable to participate in plan of care at this time.   []         Other:        Leo Oconnor   Time Calculation: 24 mins

## 2020-04-19 NOTE — PROGRESS NOTES
Problem: Pressure Injury - Risk of  Goal: *Prevention of pressure injury  Description: Document Hugh Scale and appropriate interventions in the flowsheet. Outcome: Progressing Towards Goal  Note: Pressure Injury Interventions:  Sensory Interventions: Assess changes in LOC, Keep linens dry and wrinkle-free, Discuss PT/OT consult with provider     Activity Interventions: Pressure redistribution bed/mattress(bed type), PT/OT evaluation    Mobility Interventions: HOB 30 degrees or less, Pressure redistribution bed/mattress (bed type), PT/OT evaluation    Nutrition Interventions: Document food/fluid/supplement intake    Friction and Shear Interventions: HOB 30 degrees or less      Problem: Patient Education: Go to Patient Education Activity  Goal: Patient/Family Education  Outcome: Progressing Towards Goal     Problem: Falls - Risk of  Goal: *Absence of Falls  Description: Document Robert Fall Risk and appropriate interventions in the flowsheet.   Outcome: Progressing Towards Goal  Note: Fall Risk Interventions:  Mobility Interventions: Communicate number of staff needed for ambulation/transfer, Patient to call before getting OOB, PT Consult for mobility concerns, PT Consult for assist device competence     Medication Interventions: Bed/chair exit alarm, Patient to call before getting OOB    Elimination Interventions: Call light in reach, Patient to call for help with toileting needs, Urinal in reach      Problem: Patient Education: Go to Patient Education Activity  Goal: Patient/Family Education  Outcome: Progressing Towards Goal     Problem: Patient Education: Go to Patient Education Activity  Goal: Patient/Family Education  Outcome: Progressing Towards Goal     Problem: Discharge Planning  Goal: *Discharge to safe environment  Outcome: Progressing Towards Goal     Problem: Pain  Goal: *Control of Pain  Outcome: Progressing Towards Goal     Problem: Surgical Pathway Post-Op Day 2 through Discharge  Goal: Off Pathway (Use only if patient is Off Pathway)  Outcome: Progressing Towards Goal  Goal: Activity/Safety  Outcome: Progressing Towards Goal  Goal: Nutrition/Diet  Outcome: Progressing Towards Goal  Goal: Discharge Planning  Outcome: Progressing Towards Goal  Goal: Medications  Outcome: Progressing Towards Goal  Goal: Respiratory  Outcome: Progressing Towards Goal  Goal: Treatments/Interventions/Procedures  Outcome: Progressing Towards Goal  Goal: Psychosocial  Outcome: Progressing Towards Goal  Goal: *No signs and symptoms of infection or wound complications  Outcome: Progressing Towards Goal  Goal: *Optimal pain control at patient's stated goal  Outcome: Progressing Towards Goal  Goal: *Adequate urinary output (equal to or greater than 30 milliliters/hour)  Outcome: Progressing Towards Goal  Goal: *Hemodynamically stable  Outcome: Progressing Towards Goal  Goal: *Tolerating diet  Outcome: Progressing Towards Goal  Goal: *Demonstrates progressive activity  Outcome: Progressing Towards Goal  Goal: *Lungs clear or at baseline  Outcome: Progressing Towards Goal

## 2020-04-19 NOTE — PROGRESS NOTES
POD 3  Postoperative Diagnosis: L/S stenosis m43.16       Procedure: Procedure(s):  Remove instrumentation and explore fusion l3-l5, decompression l2-l3, l5-s1, smith funes osteotomy, T12-L1, L1-2, l3-l4, L5-S1 flospine t10-s1,iliac bolts, jumper rods, PSF T10-S1          HPI:   Uneventful night    C/o post op pain; no leg pain. Today he reports less PTA foot numbness; previously he only reported  left foot drop which he has had since last summer  No CP SOB  No N/V; passing flatus  Voiding well    ACTIVE MEDICAL PROBLEMS/PMH/PSH    Patient Active Problem List   Diagnosis Code    Lumbar stenosis M48.061    Lumbar radicular pain M54.16     PMH:  Past Medical History:   Diagnosis Date    Back pain     Diabetes ([facility]     5.7 A1C    GERD (gastroesophageal reflux disease)     Hypertension     Imbalance     Kidney stone     Numbness and tingling of both lower extremities     Rheumatoid arthritis (HCC)     Weakness of both legs      LESI x 3 Dr. Gil Bad  Cervical radiculopathy  Left Hip OA  Left foot drop  Lumbar disc narrowing  Lumbar spinal stenosis  Compression Fracture L1 vertebrate  Sensorineural hearing loss, asymmetrical    PSH:  Past Surgical History:   Procedure Laterality Date    HX BACK SURGERY  03/02/2018    HX CATARACT REMOVAL Bilateral     HX CERVICAL FUSION      HX HERNIA REPAIR      HX LAP CHOLECYSTECTOMY      HX OTHER SURGICAL      left arn ulnar nerve repair    HX ROTATOR CUFF REPAIR Left      Lumbar decompression L3-L5 03/15  Decompression and Fusion  L3-L5 05/18    PTA MEDICATIONS  Medications Prior to Admission   Medication Sig    glimepiride (AmaryL) 1 mg tablet Take 1 mg by mouth two (2) times a day.  pantoprazole (Protonix) 40 mg tablet Take 40 mg by mouth daily.  diclofenac (VOLTAREN) 1 % gel     ferrous sulfate (SLOW FE) 142 mg (45 mg iron) ER tablet Take 142 mg by mouth every other day.  lisinopril (PRINIVIL, ZESTRIL) 5 mg tablet Take 5 mg by mouth daily.     calcium-cholecalciferol, d3, (CALCIUM 600 + D) 600-125 mg-unit tab Take  by mouth. Indications: TWICE DAILY    leflunomide (ARAVA) 20 mg tablet Take 20 mg by mouth daily.  TOFACITINIB CITRATE (XELJANZ PO) Take 5 mg by mouth two (2) times a day. Indications: TWICE DAILY       ALLERGIES:    Allergies   Allergen Reactions    Aspirin Other (comments)     Large doses stomach upset. Can tolerate baby aspirin     Bactrim [Sulfamethoprim] Rash    Gabapentin Other (comments)     Memory affected.  Paxil [Paroxetine Hcl] Unknown (comments)    Serotonin 5ht-3 Antagonists Other (comments)     Not allergic . Wrong drug.  Sulfa (Sulfonamide Antibiotics) Rash        FAMILY HISTORY   Father:  Mother:  Brothers:  Sisters :  Children    Family History   Problem Relation Age of Onset    Diabetes Mother     Heart Disease Father     Cancer Sister     Stroke Sister     Heart Disease Brother        SOCIAL HISTORY  Marital Status:  Education Completed:  Occupation:  Tobacco use: no  Alcohol use: no  Drug use: no  STI: no  Living will:   POA:     Social History     Socioeconomic History    Marital status: SINGLE     Spouse name: Not on file    Number of children: Not on file    Years of education: Not on file    Highest education level: Not on file   Occupational History    Not on file   Social Needs    Financial resource strain: Not on file    Food insecurity     Worry: Not on file     Inability: Not on file   [employer] needs     Medical: Not on file     Non-medical: Not on file   Tobacco Use    Smoking status: Former Smoker     Last attempt to quit: 1978     Years since quittin.3    Smokeless tobacco: Never Used    Tobacco comment: quit 38 years ago.    Substance and Sexual Activity    Alcohol use: No    Drug use: Never    Sexual activity: Not on file   Lifestyle    Physical activity     Days per week: Not on file     Minutes per session: Not on file    Stress: Not on file   Relationships    Social connections     Talks on phone: Not on file     Gets together: Not on file     Attends [employer] service: Not on file     Active member of club or organization: Not on file     Attends meetings of clubs or organizations: Not on file     Relationship status: Not on file    Intimate partner violence     Fear of current or ex partner: Not on file     Emotionally abused: Not on file     Physically abused: Not on file     Forced sexual activity: Not on file   Other Topics Concern    Not on file   Social History Narrative    Not on file       ROS:  Constitutional:  No headache. Cardiac: HPI  Respiratory: No cough  GI: HPI  : HPI  Skin: No rash. No itching      Physical Exam:      Visit Vitals  /67 (BP 1 Location: Right arm, BP Patient Position: Sitting)   Pulse (!) 111   Temp 98 °F (36.7 °C)   Resp 15   Ht 5' 7\" (1.702 m)   Wt 68 kg (150 lb)   SpO2 96%   BMI 23.49 kg/m²     GENERAL: Awake Alert NAD  LUNGS: CTA. BS Normal Bilaterally  HEART: tachycardic 110/min   Normal S1 S2. 1-2/6 MRM. No S3 S4  ABDOMEN: Soft. Normal BS. No tenderness  LE: trace edema .  SCD in place  PULSES: Radial 2+ B/L; PT 1+ B/L    NEUROLOGIC:     Oriented X 4  Muscle Strength  RLE: quads 5/5 AT 5/5 GCS 5/5  LLE: quads 5/5 dorsiflexion 2/5 plantar flexion 5/5 GCS 5/5    Labs Reviewed:    Recent Results (from the past 24 hour(s))   HGB & HCT    Collection Time: 04/18/20  4:50 PM   Result Value Ref Range    HGB 8.1 (L) 13.0 - 16.0 g/dL    HCT 24.3 (L) 36.0 - 48.0 %   GLUCOSE, POC    Collection Time: 04/18/20  5:22 PM   Result Value Ref Range    Glucose (POC) 138 (H) 70 - 110 mg/dL   GLUCOSE, POC    Collection Time: 04/18/20  8:11 PM   Result Value Ref Range    Glucose (POC) 139 (H) 70 - 110 mg/dL   GLUCOSE, POC    Collection Time: 04/18/20  9:24 PM   Result Value Ref Range    Glucose (POC) 126 (H) 70 - 110 mg/dL   CBC WITH AUTOMATED DIFF    Collection Time: 04/19/20  7:10 AM   Result Value Ref Range    WBC 8.5 4.6 - 13.2 K/uL    RBC 2.74 (L) 4.70 - 5.50 M/uL    HGB 8.2 (L) 13.0 - 16.0 g/dL    HCT 24.9 (L) 36.0 - 48.0 %    MCV 90.9 74.0 - 97.0 FL    MCH 29.9 24.0 - 34.0 PG    MCHC 32.9 31.0 - 37.0 g/dL    RDW 14.5 11.6 - 14.5 %    PLATELET 249 178 - 842 K/uL    MPV 9.7 9.2 - 11.8 FL    NEUTROPHILS 83 (H) 40 - 73 %    LYMPHOCYTES 5 (L) 21 - 52 %    MONOCYTES 11 (H) 3 - 10 %    EOSINOPHILS 1 0 - 5 %    BASOPHILS 0 0 - 2 %    ABS. NEUTROPHILS 7.1 1.8 - 8.0 K/UL    ABS. LYMPHOCYTES 0.4 (L) 0.9 - 3.6 K/UL    ABS. MONOCYTES 0.9 0.05 - 1.2 K/UL    ABS. EOSINOPHILS 0.1 0.0 - 0.4 K/UL    ABS.  BASOPHILS 0.0 0.0 - 0.1 K/UL    DF AUTOMATED     GLUCOSE, POC    Collection Time: 04/19/20  7:47 AM   Result Value Ref Range    Glucose (POC) 115 (H) 70 - 110 mg/dL   GLUCOSE, POC    Collection Time: 04/19/20 11:01 AM   Result Value Ref Range    Glucose (POC) 115 (H) 70 - 110 mg/dL     ASSESSMENT  Post removal of  instrumentation and explore fusion l3-l5, decompression l2-l3, l5-s1, smith funes osteotomy, T12-L1, L1-2, l3-l4, L5-S1 flospine t10-s1,iliac bolts, jumper rods, PSF T10-S1  Expected post op anemia due to acute blood loss- H/H stable post transfusion  T2DM- good  BS reading   HTN stable  RA  PTA feft foot drop   Chronic anemia probably due to chronic diease anemia (RA)   Metabolic encephalopathy due to analgesics resolved    PLAN  Continue same meds/POC  H/H in MD [place]ul[place]  4/19/2020, 10:00 AM    CELL [phone]

## 2020-04-19 NOTE — PROGRESS NOTES
Progress Note    Patient: Jennifer Estrella MRN: [id number]  SSN: [id number]    YOB: 1945  Age: 76 y.o. Sex: male      Admit Date: 4/16/2020    LOS: 3 days     Subjective:      Jennifer Estrella is a 77 yo M c/o low back pain. Pt reports numbness over dorsal foot has improved. Has no new ortho complaints. Objective:     Vitals:    04/18/20 1154 04/18/20 1713 04/19/20 0027 04/19/20 0739   BP: 117/61 125/73 122/61 142/70   Pulse: (!) 102 (!) 105 (!) 110 (!) 111   Resp: 18 18 18 16   Temp: 98.6 °F (37 °C) 98.1 °F (36.7 °C) 98.1 °F (36.7 °C) 98 °F (36.7 °C)   SpO2: 99% 99% 91% 93%   Weight:       Height:            Intake and Output:  Current Shift: No intake/output data recorded. Last three shifts: 04/17 1901 - 04/19 0700  In: 6487.9 [P.O.:1680; I.V.:4497.9]  Out: 1800 [Urine:1800]    Physical Exam:     GENERAL: alert, fatigued, cooperative, no distress, appears stated age  EXTREMITIES:  Extremities atraumatic, no cyanosis or edema. Calves soft and NT. Sensation to light touch intact. 2+ DP bilaterally. Strength in LE symmetrical and appropriate s/p sx. NEUROLOGIC: AOx3. SKIN: Dressing CDI. PSYCHIATRIC: non focal, happy    Lab/Data Review: All lab results for the last 24 hours reviewed. No new ortho imaging today. Assessment:     1.  Patient is s/p REMOVE SPINE FIX DEV,POST SGMTAL [74451] (SPINE LUMBAR POSTERIOR INTERBODY FUSION (PLIF))  EXPLORATION OF SPINAL FUSION [59197]  LAMINEC/FACETECT/FORAMIN,LUMBAR [46114]  LAMINEC/FACETECT/FORAMIN,EACH ADDNL [41968]  VA ARTHDSIS POST/POSTEROLATRL/POSTINTERBODY LUMBAR [24113]  VA ARTHDSIS POST/POSTERLATRL/POSTINTRBDYADL SPC/SEG [30454]  OSTEOTOMY LUMB SP,POST,1 LVL [98777]  OSTEOTOMY,POST,EA ADDN SGMT [72774]  INSERT VERT FIX DEV,POST,7-12 SGMTS [40833]  INSERT PELVIC FIXATION DEVICE [33196]  VA OSTEOTOMY SPINE POSTERIOR 3 COLUMN LUMBAR [22207]  VA ALLOGRAFT FOR SPINE SURGERY ONLY STRUCTURAL [91840] for Lumbar stenosis [J20.923]       Plan: 1. Mobilize. Cont PT. 2. Pain management. 3. Discharge planning to SNF when medically clear. Pt needs to make more progress in PT.     Signed By: BROOKS Castanon     April 19, 2020

## 2020-04-20 LAB
GLUCOSE BLD STRIP.AUTO-MCNC: 118 MG/DL (ref 70–110)
GLUCOSE BLD STRIP.AUTO-MCNC: 127 MG/DL (ref 70–110)
GLUCOSE BLD STRIP.AUTO-MCNC: 153 MG/DL (ref 70–110)
HCT VFR BLD AUTO: 23 % (ref 36–48)
HGB BLD-MCNC: 7.7 G/DL (ref 13–16)

## 2020-04-20 PROCEDURE — 82962 GLUCOSE BLOOD TEST: CPT

## 2020-04-20 PROCEDURE — 74011636637 HC RX REV CODE- 636/637: Performed by: INTERNAL MEDICINE

## 2020-04-20 PROCEDURE — 74011250636 HC RX REV CODE- 250/636: Performed by: INTERNAL MEDICINE

## 2020-04-20 PROCEDURE — 65270000029 HC RM PRIVATE

## 2020-04-20 PROCEDURE — 97530 THERAPEUTIC ACTIVITIES: CPT

## 2020-04-20 PROCEDURE — 36415 COLL VENOUS BLD VENIPUNCTURE: CPT

## 2020-04-20 PROCEDURE — 74011250637 HC RX REV CODE- 250/637: Performed by: INTERNAL MEDICINE

## 2020-04-20 PROCEDURE — 85018 HEMOGLOBIN: CPT

## 2020-04-20 RX ORDER — POTASSIUM CHLORIDE 20 MEQ/1
20 TABLET, EXTENDED RELEASE ORAL ONCE
Status: ACTIVE | OUTPATIENT
Start: 2020-04-20 | End: 2020-04-21

## 2020-04-20 RX ORDER — FUROSEMIDE 10 MG/ML
40 INJECTION INTRAMUSCULAR; INTRAVENOUS ONCE
Status: COMPLETED | OUTPATIENT
Start: 2020-04-20 | End: 2020-04-20

## 2020-04-20 RX ORDER — FUROSEMIDE 40 MG/1
40 TABLET ORAL ONCE
Status: COMPLETED | OUTPATIENT
Start: 2020-04-20 | End: 2020-04-20

## 2020-04-20 RX ORDER — FUROSEMIDE 40 MG/1
40 TABLET ORAL DAILY
Status: DISCONTINUED | OUTPATIENT
Start: 2020-04-21 | End: 2020-04-21 | Stop reason: HOSPADM

## 2020-04-20 RX ADMIN — OXYCODONE HYDROCHLORIDE AND ACETAMINOPHEN 1 TABLET: 10; 325 TABLET ORAL at 05:30

## 2020-04-20 RX ADMIN — Medication 10 ML: at 14:00

## 2020-04-20 RX ADMIN — Medication 10 ML: at 21:53

## 2020-04-20 RX ADMIN — INSULIN LISPRO 2 UNITS: 100 INJECTION, SOLUTION INTRAVENOUS; SUBCUTANEOUS at 17:57

## 2020-04-20 RX ADMIN — OXYCODONE HYDROCHLORIDE AND ACETAMINOPHEN 1 TABLET: 10; 325 TABLET ORAL at 10:52

## 2020-04-20 RX ADMIN — FUROSEMIDE 40 MG: 10 INJECTION, SOLUTION INTRAMUSCULAR; INTRAVENOUS at 14:27

## 2020-04-20 RX ADMIN — Medication 10 ML: at 05:32

## 2020-04-20 RX ADMIN — FUROSEMIDE 40 MG: 40 TABLET ORAL at 21:44

## 2020-04-20 RX ADMIN — OXYCODONE HYDROCHLORIDE AND ACETAMINOPHEN 1 TABLET: 10; 325 TABLET ORAL at 21:51

## 2020-04-20 RX ADMIN — SODIUM CHLORIDE AND POTASSIUM CHLORIDE: 9; 1.49 INJECTION, SOLUTION INTRAVENOUS at 10:52

## 2020-04-20 NOTE — PROGRESS NOTES
Problem: Pressure Injury - Risk of  Goal: *Prevention of pressure injury  Description: Document Hugh Scale and appropriate interventions in the flowsheet. Outcome: Progressing Towards Goal  Note: Pressure Injury Interventions:  Sensory Interventions: Assess changes in LOC, Keep linens dry and wrinkle-free, Maintain/enhance activity level, Pressure redistribution bed/mattress (bed type)    Moisture Interventions: Absorbent underpads    Activity Interventions: PT/OT evaluation, Pressure redistribution bed/mattress(bed type), Increase time out of bed    Mobility Interventions: HOB 30 degrees or less, Pressure redistribution bed/mattress (bed type), PT/OT evaluation    Nutrition Interventions: Document food/fluid/supplement intake    Friction and Shear Interventions: HOB 30 degrees or less                Problem: Patient Education: Go to Patient Education Activity  Goal: Patient/Family Education  Outcome: Progressing Towards Goal     Problem: Falls - Risk of  Goal: *Absence of Falls  Description: Document Robert Fall Risk and appropriate interventions in the flowsheet.   Outcome: Progressing Towards Goal  Note: Fall Risk Interventions:  Mobility Interventions: Communicate number of staff needed for ambulation/transfer, Patient to call before getting OOB, PT Consult for mobility concerns         Medication Interventions: Evaluate medications/consider consulting pharmacy, Patient to call before getting OOB    Elimination Interventions: Call light in reach, Patient to call for help with toileting needs, Urinal in reach              Problem: Patient Education: Go to Patient Education Activity  Goal: Patient/Family Education  Outcome: Progressing Towards Goal     Problem: Patient Education: Go to Patient Education Activity  Goal: Patient/Family Education  Outcome: Progressing Towards Goal     Problem: Discharge Planning  Goal: *Discharge to safe environment  Outcome: Progressing Towards Goal     Problem: Pain  Goal: *Control of Pain  Outcome: Progressing Towards Goal     Problem: Surgical Pathway Post-Op Day 2 through Discharge  Goal: Off Pathway (Use only if patient is Off Pathway)  Outcome: Progressing Towards Goal  Goal: Activity/Safety  Outcome: Progressing Towards Goal  Goal: Nutrition/Diet  Outcome: Progressing Towards Goal  Goal: Discharge Planning  Outcome: Progressing Towards Goal  Goal: Medications  Outcome: Progressing Towards Goal  Goal: Respiratory  Outcome: Progressing Towards Goal  Goal: Treatments/Interventions/Procedures  Outcome: Progressing Towards Goal  Goal: Psychosocial  Outcome: Progressing Towards Goal  Goal: *No signs and symptoms of infection or wound complications  Outcome: Progressing Towards Goal  Goal: *Optimal pain control at patient's stated goal  Outcome: Progressing Towards Goal  Goal: *Adequate urinary output (equal to or greater than 30 milliliters/hour)  Outcome: Progressing Towards Goal  Goal: *Hemodynamically stable  Outcome: Progressing Towards Goal  Goal: *Tolerating diet  Outcome: Progressing Towards Goal  Goal: *Demonstrates progressive activity  Outcome: Progressing Towards Goal  Goal: *Lungs clear or at baseline  Outcome: Progressing Towards Goal

## 2020-04-20 NOTE — PROGRESS NOTES
Chart reviewed. Noted rec from therapy for SNF. Met with pt to discuss disposition, he would like SNF for rehab. He lives in [address] would [address]F there. States ok to post to all [address] SNF's & he'll choose from accepting. Jw Mc (SNF) Provider list with star ratings has been given to the patient and/or patient representative. Posted in [employer] & "[employer] Will cont to follow. Lizet Staples RN,ext [phone].

## 2020-04-20 NOTE — PROGRESS NOTES
0830  Received pt on bed, back dressing dry and intact, with old blood  Below the dressing, no active bleeding noted. Moving all extremities, but pt said his  both legs remain tingling at  Times, but he mention is less, no foot drops  so far but  Place pillows to  Secure foot drops. Pain  Med written on the board. 1200  Up  By PT sitting on the recliner for  Lunch with TLSO, encourage use if IS, raised up to 1500 ml.    1420  Seen by Dr Javier Madden, aware about pt swollen  Extremities, no SOB noted order now Lasix IV, given , monitor output. 1700  Back  To bed by staff, had  Voided with large stool mixed after  Lasix IV given    1830  No  SOB and no chest pain, legs elevated due to edema, non tender and  Both  Back of the knees  Soft to touch.

## 2020-04-20 NOTE — PROGRESS NOTES
Progress Note      Post Operative Day: 4    Assessment:    1. Status post  Remove instrumentation and explore fusion l3-l5, decompression l2-l3, l5-s1, smith funes osteotomy, T12-L1, L1-2, l3-l4, L5-S1 flospine t10-s1,iliac bolts, jumper rods, PSF T10-S1 for Lumbar stenosis [M48.061] 4/16/2020,   Slowly progressing. PLAN:    1. Mobilize. Continue P.T.  2. WBAT with TLSO and walker, spine precautions  3. Frequent mobilization and SCDs for DVT prophylaxis  4. Discharge Planning; SNF pending authorization           HPI: Jennifer Clayton is a 76 y.o. male patient without new orthopaedic changes. Blood pressure 131/77, pulse (!) 113, temperature 98.9 °F (37.2 °C), resp. rate 18, height 5' 7\" (1.702 m), weight 68 kg (150 lb), SpO2 100 %. CBC w/Diff   Lab Results   Component Value Date/Time    WBC 8.5 04/19/2020 07:10 AM    RBC 2.74 (L) 04/19/2020 07:10 AM    HGB 7.7 (L) 04/20/2020 06:40 AM    HCT 23.0 (L) 04/20/2020 06:40 AM    MCV 90.9 04/19/2020 07:10 AM    MCH 29.9 04/19/2020 07:10 AM    MCHC 32.9 04/19/2020 07:10 AM    RDW 14.5 04/19/2020 07:10 AM    Lab Results   Component Value Date/Time    MONOS 11 (H) 04/19/2020 07:10 AM    EOS 1 04/19/2020 07:10 AM    BASOS 0 04/19/2020 07:10 AM    RDW 14.5 04/19/2020 07:10 AM             Physical Assessment:  General: in no apparent distress, well developed and well nourished, non-toxic, in no respiratory distress and acyanotic, alert and oriented times 3   Wound: no drainage   Extremities:  Neurovascular intact BLE. Compartments soft. Able to contract  quadriceps. AT and GS intact BLE. Palpable DP/PT pulses noted. Denies paresthesias    Dressing:  dry and intact   DVT Exam:   No exam evidence to suggest DVT. Compartments soft and NT.           Radiology: no new ortho studies          - Kathi Soriano, [address] [place], [place] [address]KATELYN  4/20/2020    Office [phone]

## 2020-04-20 NOTE — PROGRESS NOTES
0720 Bedside and Verbal shift change report given to Eugenie Hashimoto and Laquita Bustamante RN (oncoming nurse) by Anna Arcos RN (offgoing nurse). Report included the following information SBAR, ED Summary, Intake/Output and MAR.     0830 Received patient lying on bed, alert and oriented x4, no signs of respiratory distress, nausea and vomiting, denies pain at this time, IV site checked and patent, dressing at back intact, with old drainage, no signs of infiltration, scrotum and bilateral lower extremities swollen as endorsed by offgoing nurse, patient denies pain when urinating using the urinal but able to void, bed in low position and locked,  call bell in reach, continue to monitor    1230 patient up in bed, resting well, continue to monitor    1355  Dr Javier Madden in the unit, visited patient room to assess pt condition, edema on scrotum and bilateral lower extremities seen, verbal order to stop IV fluid noted and done, patient has no respiratory distress, continue to monitor      1500 patient had bowel movement, loose, large amt, and voided while on bedside commode, transferred to bed safely with backbrace, no other complaints at this time, will monitor      1850 patient asleep, no complaints at this time, bed locked, low position, call bell in reach    1950 Bedside and Verbal shift change report given to Angela Chu RN (oncoming nurse) by Eugenie Hashimoto, RN  (offgoing nurse). Report included the following information SBAR, Kardex, Intake/Output and MAR.

## 2020-04-20 NOTE — PROGRESS NOTES
POD 4  Postoperative Diagnosis: L/S stenosis m43.16       Procedure: Procedure(s):  Remove instrumentation and explore fusion l3-l5, decompression l2-l3, l5-s1, smith funes osteotomy, T12-L1, L1-2, l3-l4, L5-S1 flospine t10-s1,iliac bolts, jumper rods, PSF T10-S1     HPI:   C/O scrotal swelling ; no pain; voiding well  No CP; some  SOB getting out of bed; no cough or wheezing  Post op pain acceptable; no leg pain. PTA foot numbness dcreased; No N/V; no abdominal pain passing flatus  Voiding well    ACTIVE MEDICAL PROBLEMS/PMH/PSH    Patient Active Problem List   Diagnosis Code    Lumbar stenosis M48.061    Lumbar radicular pain M54.16     PMH:  Past Medical History:   Diagnosis Date    Back pain     Diabetes ([facility] [address]     5.7 A1C    GERD (gastroesophageal reflux disease)     Hypertension     Imbalance     Kidney stone     Numbness and tingling of both lower extremities     Rheumatoid arthritis (HCC)     Weakness of both legs      LESI x 3 Dr. Marc Khanna  Cervical radiculopathy  Left Hip OA  Left foot drop  Lumbar disc narrowing  Lumbar spinal stenosis  Compression Fracture L1 vertebrate  Sensorineural hearing loss, asymmetrical    PSH:  Past Surgical History:   Procedure Laterality Date    HX BACK SURGERY  03/02/2018    HX CATARACT REMOVAL Bilateral     HX CERVICAL FUSION      HX HERNIA REPAIR      HX LAP CHOLECYSTECTOMY      HX OTHER SURGICAL      left arn ulnar nerve repair    HX ROTATOR CUFF REPAIR Left      Lumbar decompression L3-L5 03/15  Decompression and Fusion  L3-L5 05/18    PTA MEDICATIONS  Medications Prior to Admission   Medication Sig    glimepiride (AmaryL) 1 mg tablet Take 1 mg by mouth two (2) times a day.  pantoprazole (Protonix) 40 mg tablet Take 40 mg by mouth daily.  diclofenac (VOLTAREN) 1 % gel     ferrous sulfate (SLOW FE) 142 mg (45 mg iron) ER tablet Take 142 mg by mouth every other day.  lisinopril (PRINIVIL, ZESTRIL) 5 mg tablet Take 5 mg by mouth daily.     calcium-cholecalciferol, d3, (CALCIUM 600 + D) 600-125 mg-unit tab Take  by mouth. Indications: TWICE DAILY    leflunomide (ARAVA) 20 mg tablet Take 20 mg by mouth daily.  TOFACITINIB CITRATE (XELJANZ PO) Take 5 mg by mouth two (2) times a day. Indications: TWICE DAILY       ALLERGIES:    Allergies   Allergen Reactions    Aspirin Other (comments)     Large doses stomach upset. Can tolerate baby aspirin     Bactrim [Sulfamethoprim] Rash    Gabapentin Other (comments)     Memory affected.  Paxil [Paroxetine Hcl] Unknown (comments)    Serotonin 5ht-3 Antagonists Other (comments)     Not allergic . Wrong drug.  Sulfa (Sulfonamide Antibiotics) Rash        FAMILY HISTORY   Father:  Mother:  Brothers:  Sisters :  Children    Family History   Problem Relation Age of Onset    Diabetes Mother     Heart Disease Father     Cancer Sister     Stroke Sister     Heart Disease Brother        SOCIAL HISTORY  Marital Status:  Education Completed:  Occupation:  Tobacco use: no  Alcohol use: no  Drug use: no  STI: no  Living will:   POA:     Social History     Socioeconomic History    Marital status: SINGLE     Spouse name: Not on file    Number of children: Not on file    Years of education: Not on file    Highest education level: Not on file   Occupational History    Not on file   Social Needs    Financial resource strain: Not on file    Food insecurity     Worry: Not on file     Inability: Not on file   [employer] needs     Medical: Not on file     Non-medical: Not on file   Tobacco Use    Smoking status: Former Smoker     Last attempt to quit: 1978     Years since quittin.3    Smokeless tobacco: Never Used    Tobacco comment: quit 38 years ago.    Substance and Sexual Activity    Alcohol use: No    Drug use: Never    Sexual activity: Not on file   Lifestyle    Physical activity     Days per week: Not on file     Minutes per session: Not on file    Stress: Not on file   Relationships    Social connections     Talks on phone: Not on file     Gets together: Not on file     Attends Protestant service: Not on file     Active member of club or organization: Not on file     Attends meetings of clubs or organizations: Not on file     Relationship status: Not on file    Intimate partner violence     Fear of current or ex partner: Not on file     Emotionally abused: Not on file     Physically abused: Not on file     Forced sexual activity: Not on file   Other Topics Concern    Not on file   Social History Narrative    Not on file       ROS:  Constitutional:  No headache. Cardiac: HPI  Respiratory: No cough  GI: HPI  : HPI  Skin: No rash. No itching      Physical Exam:      Visit Vitals  /77 (BP 1 Location: Right arm, BP Patient Position: At rest)   Pulse (!) 113   Temp 98.9 °F (37.2 °C)   Resp 18   Ht 5' 7\" (1.702 m)   Wt 68 kg (150 lb)   SpO2 100%   BMI 23.49 kg/m²     GENERAL: Awake Alert NAD  LUNGS: CTA. BS Normal Bilaterally  HEART: tachycardic 100s   Normal S1 S2. 1-2/6 MRM. No S3 S4  ABDOMEN: Soft. Normal BS. No tenderness  Scrotal edema   BLE: Thigh and  distal leg 2+ edema .    PULSES: Radial 2+ B/L; PT 1+ B/L    Labs Reviewed:    Recent Results (from the past 24 hour(s))   GLUCOSE, POC    Collection Time: 04/19/20  4:41 PM   Result Value Ref Range    Glucose (POC) 130 (H) 70 - 110 mg/dL   GLUCOSE, POC    Collection Time: 04/19/20  9:10 PM   Result Value Ref Range    Glucose (POC) 137 (H) 70 - 110 mg/dL   HGB & HCT    Collection Time: 04/20/20  6:40 AM   Result Value Ref Range    HGB 7.7 (L) 13.0 - 16.0 g/dL    HCT 23.0 (L) 36.0 - 48.0 %   GLUCOSE, POC    Collection Time: 04/20/20  8:16 AM   Result Value Ref Range    Glucose (POC) 118 (H) 70 - 110 mg/dL   GLUCOSE, POC    Collection Time: 04/20/20 12:00 PM   Result Value Ref Range    Glucose (POC) 127 (H) 70 - 110 mg/dL     ASSESSMENT  Post removal of  instrumentation and explore fusion l3-l5, decompression l2-l3, l5-s1, theo funes osteotomy, T12-L1, L1-2, l3-l4, L5-S1 flospine t10-s1,iliac bolts, jumper rods, PSF T10-S1  Post op pain controlled  Scrotal and lower extremeties edema  Expected post op anemia due to acute blood loss- mild H/H  drop since yesterday  T2DM- good  BS reading   HTN stable  RA  PTA feft foot drop   Chronic anemia probably due to chronic diease anemia (RA)   Metabolic encephalopathy due to analgesics resolved    PLAN  D/C IVF  Lasix  BMP and H/H in AM      Vernell Vidal MD  4/20/2020, 10:00 AM    CELL [phone]

## 2020-04-20 NOTE — PROGRESS NOTES
Problem: Mobility Impaired (Adult and Pediatric)  Goal: *Acute Goals and Plan of Care (Insert Text)  Description: Physical Therapy Goals  Initiated 4/17/2020 and to be accomplished within 7 day(s)  1. Patient will move from supine to sit and sit to supine  in bed with modified independence. 2.  Patient will transfer from bed to chair and chair to bed with modified independence using the least restrictive device. 3.  Patient will perform sit to stand with modified independence. 4.  Patient will ambulate with independence for 50 feet with the least restrictive device. 5.  Patient will ascend/descend 5 stairs with handrail(s) with modified independence. Outcome: Progressing Towards Goal   PHYSICAL THERAPY TREATMENT    Patient: Cassia Garg (69 y.o. male)  Date: 4/20/2020  Diagnosis: Lumbar stenosis [M48.061]   <principal problem not specified>  Procedure(s) (LRB):  Remove instrumentation and explore fusion l3-l5, decompression l2-l3, l5-s1, smith funes osteotomy, l1-l2, l3-l4, flospine t10-s1,iliac bolts, jumper rods (N/A) 4 Days Post-Op  Precautions: Fall  PLOF: Independent in home; Mod I with cane in community  ASSESSMENT:  Min A for rolling. Max A for supine to sit from logroll with HOB at 30 degrees. Fair sitting balance; min/mod to maintain upright. Mod A for maintenance of seated balance to scoot forward to EOB. Dependent to don TLSO. Min A for sit to stand. Poor balance initially required mod/max to maintain upright d/t flexion at bilateral knees. Progressed to min A for balance at ww with approximately 15 seconds of standing. Min A for bed to chair transfer with ww; completed 3 steps with min A. Seated in recliner with min A. Poor safety awareness. Educated on benefits of frequent mobility. Small size of room limits safe mobility with AD as patient requires at this time; would benefit transfer to larger room if able to encourage progression of safe mobility.   Educated on need for RN assistance with mobility; verbalized understanding. Call bell in reach. Progression toward goals:   []      Improving appropriately and progressing toward goals  [x]      Improving slowly and progressing toward goals  []      Not making progress toward goals and plan of care will be adjusted     PLAN:  Patient continues to benefit from skilled intervention to address the above impairments. Continue treatment per established plan of care. Discharge Recommendations:  Jw Mc  Further Equipment Recommendations for Discharge:  rolling walker     SUBJECTIVE:   Patient stated It never gets easier.     OBJECTIVE DATA SUMMARY:   Critical Behavior:  Neurologic State: Alert  Orientation Level: Oriented to person  Cognition: Poor safety awareness     Psychosocial  Patient Behaviors: Calm  Purposeful Interaction: Yes  Functional Mobility:  Bed Mobility:  Rolling: Minimum assistance  Supine to Sit: Maximum assistance  Scooting: Moderate assistance  Transfers:  Sit to Stand: Minimum assistance  Stand to Sit: Minimum assistance  Bed to Chair: Minimum assistance  Balance:   Sitting: Impaired  Sitting - Static: Fair (occasional)  Sitting - Dynamic: Fair (occasional)  Standing: Impaired  Standing - Static: Poor; Fair  Standing - Dynamic : Fair  Neuro Re-Education:  Seated EOB 5 minutes    Pain:  Pain level pre-treatment: 5/10  Pain level post-treatment: 5/10     Activity Tolerance:   Fair    After treatment:   [x] Patient left in no apparent distress sitting up in chair  [] Patient left in no apparent distress in bed  [x] Call bell left within reach  [x] Nursing notified  [] Caregiver present  [] Bed alarm activated  [] SCDs applied      COMMUNICATION/EDUCATION:   [x]         Role of physical therapy in the acute care setting. [x]         Fall prevention education was provided and the patient/caregiver indicated understanding.   [x]         Patient/family have participated as able in working toward goals and plan of care. [x]         Patient/family agree to work toward stated goals and plan of care. []         Patient understands intent and goals of therapy, but is neutral about his/her participation. []         Patient is unable to participate in stated goals/plan of care: ongoing with therapy staff.       Buzz Lopez, PT   Time Calculation: 20 mins

## 2020-04-21 ENCOUNTER — APPOINTMENT (OUTPATIENT)
Dept: GENERAL RADIOLOGY | Age: 75
DRG: 456 | End: 2020-04-21
Attending: PHYSICIAN ASSISTANT
Payer: MEDICARE

## 2020-04-21 VITALS
DIASTOLIC BLOOD PRESSURE: 98 MMHG | TEMPERATURE: 98 F | OXYGEN SATURATION: 96 % | HEART RATE: 93 BPM | BODY MASS INDEX: 23.54 KG/M2 | SYSTOLIC BLOOD PRESSURE: 150 MMHG | RESPIRATION RATE: 18 BRPM | HEIGHT: 67 IN | WEIGHT: 150 LBS

## 2020-04-21 LAB
ANION GAP SERPL CALC-SCNC: 3 MMOL/L (ref 3–18)
BUN SERPL-MCNC: 10 MG/DL (ref 7–18)
BUN/CREAT SERPL: 20 (ref 12–20)
CALCIUM SERPL-MCNC: 7.6 MG/DL (ref 8.5–10.1)
CHLORIDE SERPL-SCNC: 104 MMOL/L (ref 100–111)
CO2 SERPL-SCNC: 29 MMOL/L (ref 21–32)
CREAT SERPL-MCNC: 0.51 MG/DL (ref 0.6–1.3)
GLUCOSE BLD STRIP.AUTO-MCNC: 125 MG/DL (ref 70–110)
GLUCOSE BLD STRIP.AUTO-MCNC: 138 MG/DL (ref 70–110)
GLUCOSE BLD STRIP.AUTO-MCNC: 148 MG/DL (ref 70–110)
GLUCOSE BLD STRIP.AUTO-MCNC: 214 MG/DL (ref 70–110)
GLUCOSE SERPL-MCNC: 123 MG/DL (ref 74–99)
HCT VFR BLD AUTO: 24.3 % (ref 36–48)
HGB BLD-MCNC: 8.1 G/DL (ref 13–16)
POTASSIUM SERPL-SCNC: 3.5 MMOL/L (ref 3.5–5.5)
SODIUM SERPL-SCNC: 136 MMOL/L (ref 136–145)

## 2020-04-21 PROCEDURE — 36415 COLL VENOUS BLD VENIPUNCTURE: CPT

## 2020-04-21 PROCEDURE — 71045 X-RAY EXAM CHEST 1 VIEW: CPT

## 2020-04-21 PROCEDURE — 85018 HEMOGLOBIN: CPT

## 2020-04-21 PROCEDURE — 82962 GLUCOSE BLOOD TEST: CPT

## 2020-04-21 PROCEDURE — 80048 BASIC METABOLIC PNL TOTAL CA: CPT

## 2020-04-21 PROCEDURE — 97530 THERAPEUTIC ACTIVITIES: CPT

## 2020-04-21 PROCEDURE — 74011250637 HC RX REV CODE- 250/637: Performed by: INTERNAL MEDICINE

## 2020-04-21 PROCEDURE — 74011636637 HC RX REV CODE- 636/637: Performed by: INTERNAL MEDICINE

## 2020-04-21 RX ORDER — NALOXONE HYDROCHLORIDE 4 MG/.1ML
SPRAY NASAL
Qty: 1 EACH | Refills: 0 | Status: SHIPPED | OUTPATIENT
Start: 2020-04-21

## 2020-04-21 RX ORDER — OXYCODONE AND ACETAMINOPHEN 10; 325 MG/1; MG/1
1-2 TABLET ORAL
Qty: 40 TAB | Refills: 0 | Status: SHIPPED | OUTPATIENT
Start: 2020-04-21 | End: 2020-04-28

## 2020-04-21 RX ADMIN — FUROSEMIDE 40 MG: 40 TABLET ORAL at 08:55

## 2020-04-21 RX ADMIN — INSULIN LISPRO 4 UNITS: 100 INJECTION, SOLUTION INTRAVENOUS; SUBCUTANEOUS at 13:21

## 2020-04-21 RX ADMIN — OXYCODONE HYDROCHLORIDE AND ACETAMINOPHEN 1 TABLET: 10; 325 TABLET ORAL at 06:00

## 2020-04-21 RX ADMIN — Medication 10 ML: at 06:01

## 2020-04-21 RX ADMIN — INSULIN GLARGINE 10 UNITS: 100 INJECTION, SOLUTION SUBCUTANEOUS at 00:02

## 2020-04-21 NOTE — PROGRESS NOTES
Transportation at Discharge:  4/21/2020    Transport Company/Representative:  [facility]   Transportation Phone number: [phone]  Method of Transport: [employer] / [employer]    Estimated pick-up time: 4:30P  Destination: [address]: Medicare / Michael Cottonman: No auth required     Requesting Outcomes Manager: Benito Segal, Care- ext [phone]

## 2020-04-21 NOTE — PROGRESS NOTES
Problem: Discharge Planning  Goal: *Discharge to safe environment  Outcome: Resolved/Met       SNF    Accepted to [facility] for today. Transport set up for 1630. Met with pt & made him aware, agreeable to transfer, states he will call his daughter now & let her know. Nursing made aware of above. Lizet Staples,RN,ext [phone]. Care Management Interventions  PCP Verified by CM:  Yes  Last Visit to PCP: 03/17/20  Mode of Transport at Discharge: BLS  Transition of Care Consult (CM Consult): SNF  Partner SNF: No  Reason Why Partner SNF Not Chosen: Bed availability  Discharge Durable Medical Equipment: No  Physical Therapy Consult: Yes  Occupational Therapy Consult: No  Speech Therapy Consult: No  Current Support Network: Relative's Home(lives with \"relatives\")  Confirm Follow Up Transport: Self  The Plan for Transition of Care is Related to the Following Treatment Goals : rehab  The Patient and/or Patient Representative was Provided with a Choice of Provider and Agrees with the Discharge Plan?: Yes  Name of the Patient Representative Who was Provided with a Choice of Provider and Agrees with the Discharge Plan: Memory Dessert  Freedom of Choice List was Provided with Basic Dialogue that Supports the Patient's Individualized Plan of Care/Goals, Treatment Preferences and Shares the Quality Data Associated with the Providers?: Yes  Discharge Location  Discharge Placement: Skilled nursing facility([address])

## 2020-04-21 NOTE — ROUTINE PROCESS
Bedside and Verbal shift change report given to [facility] RN (oncoming nurse) by Aristeo Flores RN (offgoing nurse). Report included the following information SBAR, Procedure Summary, Intake/Output and MAR.     2150 1 tab percocet given. 0600 1 tab percocet given. Bedside and Verbal shift change report given to Maicol CATES (oncoming nurse) by [facility] RN (offgoing nurse). Report included the following information SBAR, Intake/Output and MAR.

## 2020-04-21 NOTE — PROGRESS NOTES
Problem: Mobility Impaired (Adult and Pediatric)  Goal: *Acute Goals and Plan of Care (Insert Text)  Description: Physical Therapy Goals  Initiated 4/17/2020 and to be accomplished within 7 day(s)  1. Patient will move from supine to sit and sit to supine  in bed with modified independence. 2.  Patient will transfer from bed to chair and chair to bed with modified independence using the least restrictive device. 3.  Patient will perform sit to stand with modified independence. 4.  Patient will ambulate with independence for 50 feet with the least restrictive device. 5.  Patient will ascend/descend 5 stairs with handrail(s) with modified independence. Outcome: Progressing Towards Goal   PHYSICAL THERAPY TREATMENT    Patient: Sweetie Sommer (37 y.o. male)  Date: 4/21/2020  Diagnosis: Lumbar stenosis [M48.061]   <principal problem not specified>  Procedure(s) (LRB):  Remove instrumentation and explore fusion l3-l5, decompression l2-l3, l5-s1, smith funes osteotomy, l1-l2, l3-l4, flospine t10-s1,iliac bolts, jumper rods (N/A) 5 Days Post-Op  Precautions: Fall  PLOF: Independent in home; Mod I with cane in community  ASSESSMENT:  Min A for log rolling. Max A for supine to sit with HOB elevated. Resists PT assist at trunk. Fair sitting balance; min A to correct loss of balance when unsupported by BUE. Rigid posture in sitting; denies spasms. Dependent to don TLSO. Min A for scooting forward to EOB. Min A for sit to stand from elevated bed height. Min A for bed to chair transfer with 2-3 side steps with ww. Seated in chair with min A for descent. Educated on need for RN assistance with mobility; verbalized understanding. Call bell in reach.      Progression toward goals:   []      Improving appropriately and progressing toward goals  [x]      Improving slowly and progressing toward goals  []      Not making progress toward goals and plan of care will be adjusted     PLAN:  Patient continues to benefit from skilled intervention to address the above impairments. Continue treatment per established plan of care. Discharge Recommendations:  Jw Mc  Further Equipment Recommendations for Discharge:  brace/splint and rolling walker     SUBJECTIVE:   Patient stated That was easier.     OBJECTIVE DATA SUMMARY:   Critical Behavior:  Neurologic State: Alert  Orientation Level: Oriented to person  Cognition: Poor safety awareness    Functional Mobility:  Bed Mobility:  Rolling: Minimum assistance  Supine to Sit: Maximum assistance  Scooting: Minimum assistance  Transfers:  Sit to Stand: Minimum assistance  Stand to Sit: Minimum assistance  Bed to Chair: Minimum assistance  Balance:   Sitting: Impaired  Sitting - Static: Fair (occasional)  Sitting - Dynamic: Fair (occasional)  Standing: Impaired  Standing - Static: Fair  Standing - Dynamic : Fair  Neuro Re-Education:  Seated EOB 3 minutes    Pain:  Pain level pre-treatment: 4/10  Pain level post-treatment: 4/10     Activity Tolerance:   Fair    After treatment:   [x] Patient left in no apparent distress sitting up in chair  [] Patient left in no apparent distress in bed  [x] Call bell left within reach  [x] Nursing notified  [] Caregiver present  [] Bed alarm activated  [] SCDs applied      COMMUNICATION/EDUCATION:   [x]         Role of physical therapy in the acute care setting. [x]         Fall prevention education was provided and the patient/caregiver indicated understanding. [x]         Patient/family have participated as able in working toward goals and plan of care. [x]         Patient/family agree to work toward stated goals and plan of care. []         Patient understands intent and goals of therapy, but is neutral about his/her participation. []         Patient is unable to participate in stated goals/plan of care: ongoing with therapy staff.       Bobbi Stewart, PT   Time Calculation: 13 mins

## 2020-04-21 NOTE — PROGRESS NOTES
Problem: Pressure Injury - Risk of  Goal: *Prevention of pressure injury  Description: Document Hugh Scale and appropriate interventions in the flowsheet. Outcome: Progressing Towards Goal  Note: Pressure Injury Interventions:  Sensory Interventions: Assess changes in LOC, Avoid rigorous massage over bony prominences, Maintain/enhance activity level, Minimize linen layers, Monitor skin under medical devices, Turn and reposition approx. every two hours (pillows and wedges if needed)    Moisture Interventions: Maintain skin hydration (lotion/cream), Minimize layers, Moisture barrier    Activity Interventions: Pressure redistribution bed/mattress(bed type)    Mobility Interventions: HOB 30 degrees or less, Pressure redistribution bed/mattress (bed type), PT/OT evaluation, Turn and reposition approx. every two hours(pillow and wedges)    Nutrition Interventions: Document food/fluid/supplement intake    Friction and Shear Interventions: HOB 30 degrees or less                Problem: Patient Education: Go to Patient Education Activity  Goal: Patient/Family Education  Outcome: Progressing Towards Goal     Problem: Falls - Risk of  Goal: *Absence of Falls  Description: Document Robert Fall Risk and appropriate interventions in the flowsheet.   Outcome: Progressing Towards Goal  Note: Fall Risk Interventions:  Mobility Interventions: Bed/chair exit alarm, Strengthening exercises (ROM-active/passive)         Medication Interventions: Bed/chair exit alarm    Elimination Interventions: Bed/chair exit alarm, Call light in reach, Toilet paper/wipes in reach, Toileting schedule/hourly rounds              Problem: Patient Education: Go to Patient Education Activity  Goal: Patient/Family Education  Outcome: Progressing Towards Goal     Problem: Patient Education: Go to Patient Education Activity  Goal: Patient/Family Education  Outcome: Progressing Towards Goal     Problem: Discharge Planning  Goal: *Discharge to safe environment  Outcome: Progressing Towards Goal     Problem: Pain  Goal: *Control of Pain  Outcome: Progressing Towards Goal     Problem: Surgical Pathway Post-Op Day 2 through Discharge  Goal: Off Pathway (Use only if patient is Off Pathway)  Outcome: Progressing Towards Goal  Goal: Activity/Safety  Outcome: Progressing Towards Goal  Goal: Nutrition/Diet  Outcome: Progressing Towards Goal  Goal: Discharge Planning  Outcome: Progressing Towards Goal  Goal: Medications  Outcome: Progressing Towards Goal  Goal: Respiratory  Outcome: Progressing Towards Goal  Goal: Treatments/Interventions/Procedures  Outcome: Progressing Towards Goal  Goal: Psychosocial  Outcome: Progressing Towards Goal  Goal: *No signs and symptoms of infection or wound complications  Outcome: Progressing Towards Goal  Goal: *Optimal pain control at patient's stated goal  Outcome: Progressing Towards Goal  Goal: *Adequate urinary output (equal to or greater than 30 milliliters/hour)  Outcome: Progressing Towards Goal  Goal: *Hemodynamically stable  Outcome: Progressing Towards Goal  Goal: *Tolerating diet  Outcome: Progressing Towards Goal  Goal: *Demonstrates progressive activity  Outcome: Progressing Towards Goal  Goal: *Lungs clear or at baseline  Outcome: Progressing Towards Goal     Problem: Patient Education: Go to Patient Education Activity  Goal: Patient/Family Education  Outcome: Progressing Towards Goal

## 2020-04-21 NOTE — DISCHARGE INSTRUCTIONS
Observe precautions as instructed by your physical therapist.    When you go home, be as active as possible (within reason). Weight bearing as tolerated with walker. Spine precautions: Log roll to get out of bed. Do NOT bend forward at the waist more than 90º or raise knees higher than hips. Do NOT twist at waist. Do NOT lift, do NOT cross ankles or legs. Walk frequently. Walk as much as possible with your walker in the house. Weight bearing as tolerated unless otherwise instructed. You will transition to a cane when your therapist feels you are ready. Do not sit for more than 30 minutes at a time. Get up and move around with walker. Remove ALL throw rugs from the floor to prevent accidental falls. No baths or swimming until seen for post op visit. You may remove your dressing and shower on the 5th day after surgery. If your incision drains, or becomes reddened or painful, call the office. Notify your doctor if you develop any of the following:        A. Pain in your calf or thigh or any unusual swelling or discoloration of your legs. B.  Chest pain when taking a deep breath. C.  A fever. PAIN MEDICATIONS  You have been prescribed medication for pain. Please review all side effects and drug interactions with your pharmacist. It is best to obtain all of your medications from the same pharmacy. This will ensure that your medications will be checked for possible interactions. Pain medications usually have sedating effects and can affect your judgement. They will decrease your ability to operate machinery or vehicles safely. DO NOT participate in activities where you, or someone else, has a risk of injury. DO NOT drive, operate tools, operate machinery, or make legal decisions while taking this medication. Most prescription pain medications may cause constipation.  We recommend taking an over-the-counter stool softener as directed or a medication called Miralax as directed while taking pain medications. Seda Gomez    Per the [facility] HEALTHCARE legislation, you may have been prescribed NARCAN/NALOXONE nasal spray. This is because 1 (or more) of your medications (sedating or narcotic medications) meet the state legislation requiring the prescription of narcan. This should be used for emergency purposes during suspected pain medication overdose. WHEN TO USE  If someone does not wake up with things like pinching the earlobe or rubbing the breast bone with your knuckles. Breathing and heart rate has slowed or stopped. Lips are blue and/or fingers. CALL 911! *Narcan reverses the effects of narcotics in emergencies such as: unresponsiveness and breathing difficulties. If this medicine is used, THEN YOU MUST call 911 or your local emergency number. INCENTIVE SPIROMETER USE - Extremely important during your recovery. Importance of using: When you empty out and refill the air in your lungs, you get rid of fluid and germs that can lead to an infection. You also exercise your lungs, so that theyre able to put more oxygen into your body. That helps you to heal and avoid lung infections! 1. Sit on the edge of your bed if possible, or sit up as far as you can in bed. 2. Hold the incentive spirometer in an upright position. 3. Place the mouthpiece in your mouth and seal your lips tightly around it. 4. Breathe in slowly and as deeply as possible. Notice the piston rising toward the top of the column. Hold your breath for a few seconds. Then exhale slowly and allow the piston to fall to the bottom of the column. 5. Rest for a few seconds and repeat steps one to 5 at least 10 times every hour. An important reminder can be each time a commercial appears while watching television. 6. Position the indicator on the left side of the spirometer to show your best effort. Use the indicator as a goal to work toward during each slow deep breath.   7. After each set of 10 deep breaths, cough to be sure your lungs are clear. Call office with any questions or concerns at [phone]. DISCHARGE SUMMARY from Nurse    PATIENT INSTRUCTIONS:    After general anesthesia or intravenous sedation, for 24 hours or while taking prescription Narcotics:  · Limit your activities  · Do not drive and operate hazardous machinery  · Do not make important personal or business decisions  · Do  not drink alcoholic beverages  · If you have not urinated within 8 hours after discharge, please contact your surgeon on call. Report the following to your surgeon:  · Excessive pain, swelling, redness or odor of or around the surgical area  · Temperature over 100.5  · Nausea and vomiting lasting longer than 4 hours or if unable to take medications  · Any signs of decreased circulation or nerve impairment to extremity: change in color, persistent  numbness, tingling, coldness or increase pain  · Any questions    What to do at Home:  Recommended activity: Activity as tolerated and PT/OT Eval and Treat    If you experience any of the following symptoms uncontrolled pain, fever, or chills, please follow up with rehab staff/primary care physician. *  Please give a list of your current medications to your Primary Care Provider. *  Please update this list whenever your medications are discontinued, doses are      changed, or new medications (including over-the-counter products) are added. *  Please carry medication information at all times in case of emergency situations. These are general instructions for a healthy lifestyle:    No smoking/ No tobacco products/ Avoid exposure to second hand smoke  Surgeon General's Warning:  Quitting smoking now greatly reduces serious risk to your health.     Obesity, smoking, and sedentary lifestyle greatly increases your risk for illness    A healthy diet, regular physical exercise & weight monitoring are important for maintaining a healthy lifestyle    You may be retaining fluid if you have a history of heart failure or if you experience any of the following symptoms:  Weight gain of 3 pounds or more overnight or 5 pounds in a week, increased swelling in our hands or feet or shortness of breath while lying flat in bed. Please call your doctor as soon as you notice any of these symptoms; do not wait until your next office visit. The discharge information has been reviewed with the patient. The patient verbalized understanding. Discharge medications reviewed with the patient and appropriate educational materials and side effects teaching were provided. Patient discharged without removing armband and transfered to another healthcare acute, sub acute , or extended care facility. Informed of privacy risks if armband lost or stolen.

## 2020-04-21 NOTE — ROUTINE PROCESS
6149:  Administered due meds, patient sitting up in chair. 1000:  Patient sitting up in bed with brace. Showing no s/s of pain/distress. 1321: Insulin administered per protocol. 1415:  Dr. Megan Iglesias at bedside to assess dressing, CDI. Patient incontinent of urine; care provided, gown, bed pad changed.

## 2020-04-21 NOTE — PROGRESS NOTES
Called Dr. Hernan Bean and informed him of plan for pt to be discharged today to a SNF. MD stated he will come see the pt this morning. Primary nurse, Kaylee Mendes notified.

## 2020-04-21 NOTE — PROGRESS NOTES
POD 5  Postoperative Diagnosis: L/S stenosis m43.16       Procedure: Procedure(s):  Remove instrumentation and explore fusion l3-l5, decompression l2-l3, l5-s1, smith funes osteotomy, T12-L1, L1-2, l3-l4, L5-S1 flospine t10-s1,iliac bolts, jumper rods, PSF T10-S1     HPI:   Uneventful night  Good diuresis with lasix   Scrotal swelling decreased   No CP. No SOB  Post op pain acceptable; no leg pain. No N/V; no abdominal pain; BM testerday  Voiding well    ACTIVE MEDICAL PROBLEMS/PMH/PSH    Patient Active Problem List   Diagnosis Code    Lumbar stenosis M48.061    Lumbar radicular pain M54.16     PMH:  Past Medical History:   Diagnosis Date    Back pain     Diabetes ([facility] [address]     5.7 A1C    GERD (gastroesophageal reflux disease)     Hypertension     Imbalance     Kidney stone     Numbness and tingling of both lower extremities     Rheumatoid arthritis (HCC)     Weakness of both legs      LESI x 3 Dr. Carmel Morgan  Cervical radiculopathy  Left Hip OA  Left foot drop  Lumbar disc narrowing  Lumbar spinal stenosis  Compression Fracture L1 vertebrate  Sensorineural hearing loss, asymmetrical    PSH:  Past Surgical History:   Procedure Laterality Date    HX BACK SURGERY  03/02/2018    HX CATARACT REMOVAL Bilateral     HX CERVICAL FUSION      HX HERNIA REPAIR      HX LAP CHOLECYSTECTOMY      HX OTHER SURGICAL      left arn ulnar nerve repair    HX ROTATOR CUFF REPAIR Left      Lumbar decompression L3-L5 03/15  Decompression and Fusion  L3-L5 05/18    PTA MEDICATIONS  Medications Prior to Admission   Medication Sig    glimepiride (AmaryL) 1 mg tablet Take 1 mg by mouth two (2) times a day.  pantoprazole (Protonix) 40 mg tablet Take 40 mg by mouth daily.  diclofenac (VOLTAREN) 1 % gel     ferrous sulfate (SLOW FE) 142 mg (45 mg iron) ER tablet Take 142 mg by mouth every other day.  lisinopril (PRINIVIL, ZESTRIL) 5 mg tablet Take 5 mg by mouth daily.     calcium-cholecalciferol, d3, (CALCIUM 600 + D) 600-125 mg-unit tab Take  by mouth. Indications: TWICE DAILY    leflunomide (ARAVA) 20 mg tablet Take 20 mg by mouth daily.  TOFACITINIB CITRATE (XELJANZ PO) Take 5 mg by mouth two (2) times a day. Indications: TWICE DAILY       ALLERGIES:    Allergies   Allergen Reactions    Aspirin Other (comments)     Large doses stomach upset. Can tolerate baby aspirin     Bactrim [Sulfamethoprim] Rash    Gabapentin Other (comments)     Memory affected.  Paxil [Paroxetine Hcl] Unknown (comments)    Serotonin 5ht-3 Antagonists Other (comments)     Not allergic . Wrong drug.  Sulfa (Sulfonamide Antibiotics) Rash        FAMILY HISTORY   Father:  Mother:  Brothers:  Sisters :  Children    Family History   Problem Relation Age of Onset    Diabetes Mother     Heart Disease Father     Cancer Sister     Stroke Sister     Heart Disease Brother        SOCIAL HISTORY  Marital Status:  Education Completed:  Occupation:  Tobacco use: no  Alcohol use: no  Drug use: no  STI: no  Living will:   POA:     Social History     Socioeconomic History    Marital status: SINGLE     Spouse name: Not on file    Number of children: Not on file    Years of education: Not on file    Highest education level: Not on file   Occupational History    Not on file   Social Needs    Financial resource strain: Not on file    Food insecurity     Worry: Not on file     Inability: Not on file   [employer] inter[employer] needs     Medical: Not on file     Non-medical: Not on file   Tobacco Use    Smoking status: Former Smoker     Last attempt to quit: 1978     Years since quittin.3    Smokeless tobacco: Never Used    Tobacco comment: quit 38 years ago.    Substance and Sexual Activity    Alcohol use: No    Drug use: Never    Sexual activity: Not on file   Lifestyle    Physical activity     Days per week: Not on file     Minutes per session: Not on file    Stress: Not on file   Relationships    Social connections     Talks on phone: Not on file Gets together: Not on file     Attends Anabaptism service: Not on file     Active member of club or organization: Not on file     Attends meetings of clubs or organizations: Not on file     Relationship status: Not on file    Intimate partner violence     Fear of current or ex partner: Not on file     Emotionally abused: Not on file     Physically abused: Not on file     Forced sexual activity: Not on file   Other Topics Concern    Not on file   Social History Narrative    Not on file       ROS:  Constitutional:  No headache. Cardiac: HPI  Respiratory: No cough  GI: HPI  : HPI  Skin: No rash. No itching      Physical Exam:      Visit Vitals  /86 (BP 1 Location: Right arm, BP Patient Position: At rest)   Pulse 97   Temp 98 °F (36.7 °C)   Resp 16   Ht 5' 7\" (1.702 m)   Wt 68 kg (150 lb)   SpO2 98%   BMI 23.49 kg/m²     GENERAL: NAD  LUNGS: CTA. BS Normal Bilaterally  HEART: RRR. 1-2/6 MRM. No S3 S4  ABDOMEN: Soft. Normal BS.  No tenderness  Scrotal edema decreased  BLE: Thigh decreased;   distal leg  edema  1+  PULSES: Radial 2+ B/L; PT 1+ B/L    Dressing: old drainage    Labs Reviewed:    Recent Results (from the past 24 hour(s))   GLUCOSE, POC    Collection Time: 04/20/20  4:27 PM   Result Value Ref Range    Glucose (POC) 153 (H) 70 - 110 mg/dL   GLUCOSE, POC    Collection Time: 04/20/20 11:58 PM   Result Value Ref Range    Glucose (POC) 148 (H) 70 - 577 mg/dL   METABOLIC PANEL, BASIC    Collection Time: 04/21/20  7:20 AM   Result Value Ref Range    Sodium 136 136 - 145 mmol/L    Potassium 3.5 3.5 - 5.5 mmol/L    Chloride 104 100 - 111 mmol/L    CO2 29 21 - 32 mmol/L    Anion gap 3 3.0 - 18 mmol/L    Glucose 123 (H) 74 - 99 mg/dL    BUN 10 7.0 - 18 MG/DL    Creatinine 0.51 (L) 0.6 - 1.3 MG/DL    BUN/Creatinine ratio 20 12 - 20      GFR est AA >60 >60 ml/min/1.73m2    GFR est non-AA >60 >60 ml/min/1.73m2    Calcium 7.6 (L) 8.5 - 10.1 MG/DL   HGB & HCT    Collection Time: 04/21/20  7:20 AM   Result Value Ref Range    HGB 8.1 (L) 13.0 - 16.0 g/dL    HCT 24.3 (L) 36.0 - 48.0 %   GLUCOSE, POC    Collection Time: 04/21/20  7:43 AM   Result Value Ref Range    Glucose (POC) 138 (H) 70 - 110 mg/dL   GLUCOSE, POC    Collection Time: 04/21/20 11:57 AM   Result Value Ref Range    Glucose (POC) 214 (H) 70 - 110 mg/dL     CXR 04/21/20: developing right lung base opacity    ASSESSMENT  Post removal of  instrumentation and explore fusion l3-l5, decompression l2-l3, l5-s1, smith funes osteotomy, T12-L1, L1-2, l3-l4, L5-S1 flospine t10-s1,iliac bolts, jumper rods, PSF T10-S1  Post op pain controlled  Scrotal and lower extremeties edema decreasing  Expected post op anemia due to acute blood loss- H/H  stable  T2DM  HTN  RA  PTA feft foot drop   Chronic anemia probably due to chronic diease anemia (RA)   Metabolic encephalopathy due to analgesics resolved  RLL opacity probably atelectasis or bit  fluid; patient has no cough or SOB; afebrile    PLAN   Medically stable for transfer to SNF  Continue lasix 40 mg daily and KCL 20 meq daily until scrotal and LE edema resolve        Vannessa Argueta MD  4/21/2020, 10:00 AM    CELL [phone]

## 2020-04-21 NOTE — PROGRESS NOTES
FILOMENA submitted successfully in EPAS.  Form ID # :[id number]  Plan is SNF for short- term rehab  Has not applied for Medicaid    Ric Machuca RN    Outcomes Manager  [phone]  [phone]

## 2020-04-21 NOTE — PROGRESS NOTES
Problem: Mobility Impaired (Adult and Pediatric)  Goal: *Acute Goals and Plan of Care (Insert Text)  Description: Physical Therapy Goals  Initiated 4/17/2020 and to be accomplished within 7 day(s)  1. Patient will move from supine to sit and sit to supine  in bed with modified independence. 2.  Patient will transfer from bed to chair and chair to bed with modified independence using the least restrictive device. 3.  Patient will perform sit to stand with modified independence. 4.  Patient will ambulate with independence for 50 feet with the least restrictive device. 5.  Patient will ascend/descend 5 stairs with handrail(s) with modified independence. 4/21/2020 1050 by Nancie Cervantes PT  Outcome: Progressing Towards Goal   PHYSICAL THERAPY TREATMENT    Patient: Joyce Claros (32 y.o. male)  Date: 4/21/2020  Diagnosis: Lumbar stenosis [M48.061]   <principal problem not specified>  Procedure(s) (LRB):  Remove instrumentation and explore fusion l3-l5, decompression l2-l3, l5-s1, smith funes osteotomy, l1-l2, l3-l4, flospine t10-s1,iliac bolts, jumper rods (N/A) 5 Days Post-Op  Precautions: Fall  PLOF: Independent in home; Mod I with cane in community  ASSESSMENT:  Assisting with return transfer from chair to bed per nursing staff request.  Mod A for sit to stand from chair. Requires 30-45 seconds to achieve full standing and min A for balance at ww. Min A for chair to bed transfer with ww. Dependent to doff TLSO. Mod A for sit to supine transfer; adheres to logroll technique. Seated in bed with HOB elevated. Educated on need for RN assistance with mobility; verbalized understanding. Call bell in reach.      Progression toward goals:   []      Improving appropriately and progressing toward goals  [x]      Improving slowly and progressing toward goals  []      Not making progress toward goals and plan of care will be adjusted     PLAN:  Patient continues to benefit from skilled intervention to address the above impairments. Continue treatment per established plan of care. Discharge Recommendations:  Jw Jacobsuel  Further Equipment Recommendations for Discharge:  brace/splint and rolling walker     SUBJECTIVE:   Patient stated It's like white raspberry.     OBJECTIVE DATA SUMMARY:   Critical Behavior:  Neurologic State: Alert  Orientation Level: Oriented to person  Cognition: Poor safety awareness     Functional Mobility:  Bed Mobility:  Rolling: Minimum assistance  Supine to Sit: Maximum assistance  Sit to Supine: Moderate assistance  Scooting: Minimum assistance  Transfers:  Sit to Stand: Moderate assistance  Stand to Sit: Minimum assistance  Bed to Chair: Minimum assistance  Balance:   Sitting: Impaired  Sitting - Static: Fair (occasional)  Sitting - Dynamic: Fair (occasional)  Standing: Impaired  Standing - Static: Fair  Standing - Dynamic : Fair    Neuro Re-Education:  Seated EOB 3 minutes    Pain:  Pain level pre-treatment: 4/10  Pain level post-treatment: 4/10     Activity Tolerance:   Fair    After treatment:   [] Patient left in no apparent distress sitting up in chair  [x] Patient left in no apparent distress in bed  [x] Call bell left within reach  [x] Nursing notified  [] Caregiver present  [] Bed alarm activated  [] SCDs applied      COMMUNICATION/EDUCATION:   [x]         Role of physical therapy in the acute care setting. [x]         Fall prevention education was provided and the patient/caregiver indicated understanding. [x]         Patient/family have participated as able in working toward goals and plan of care. [x]         Patient/family agree to work toward stated goals and plan of care. []         Patient understands intent and goals of therapy, but is neutral about his/her participation. []         Patient is unable to participate in stated goals/plan of care: ongoing with therapy staff.       Xi Taylor, PT   Time Calculation: 8 mins

## 2020-04-21 NOTE — DISCHARGE SUMMARY
Discharge Summary    Admit Date: 2020  Discharge Date:  2020     Patient ID:   Name:  Joni Quinn      Age:  76 y.o.    :  1945      Admitting Diagnosis: Lumbar stenosis [M48.061]     Discharge Diagnosis: same      Post Operative Day: 5    Operative Procedures:  Status post  Remove instrumentation and explore fusion l3-l5, decompression l2-l3, l5-s1, smith funes osteotomy, T12-L1, L1-2, l3-l4, L5-S1 flospine t10-s1,iliac bolts, jumper rods, PSF T10-S1 for Lumbar stenosis [M48.061]       Isolation Precautions:  Not required. Patient is not currently contagious. Physical Exam on Discharge:  Visit Vitals  /62 (BP 1 Location: Right arm, BP Patient Position: At rest)   Pulse 83   Temp 98.3 °F (36.8 °C)   Resp 16   Ht 5' 7\" (1.702 m)   Wt 68 kg (150 lb)   SpO2 94%   BMI 23.49 kg/m²         General: in no apparent distress, well developed and well nourished, non-toxic, in no respiratory distress and acyanotic, alert and oriented times 3   Wound: no drainage   Extremities:  Neurovascular intact BLE. Compartments soft. Able to contract  quadriceps. AT and GS intact BLE. Palpable DP/PT pulses noted. Denies paresthesias    Dressing:  Dry and intact   DVT Exam:   No evidence of DVT seen on physical exam; compartments soft and NT.          Relevant labs within last 72 hours:    CBC w/Diff    Lab Results   Component Value Date/Time    WBC 8.5 2020 07:10 AM    RBC 2.74 (L) 2020 07:10 AM    HGB 8.1 (L) 2020 07:20 AM    HCT 24.3 (L) 2020 07:20 AM    MCV 90.9 2020 07:10 AM    MCH 29.9 2020 07:10 AM    MCHC 32.9 2020 07:10 AM    RDW 14.5 2020 07:10 AM    Lab Results   Component Value Date/Time    MONOS 11 (H) 2020 07:10 AM    EOS 1 2020 07:10 AM    BASOS 0 2020 07:10 AM    RDW 14.5 2020 07:10 AM                Condition at discharge: Afebrile  Ambulating  Eating, Drinking, Voiding  Stable    Current Discharge Medication List      START taking these medications    Details   oxyCODONE-acetaminophen (PERCOCET 10)  mg per tablet Take 1-2 Tabs by mouth every six (6) hours as needed for Pain for up to 7 days. Max Daily Amount: 8 Tabs. Qty: 40 Tab, Refills: 0    Associated Diagnoses: S/P lumbar fusion      naloxone (Narcan) 4 mg/actuation nasal spray Use 1 spray intranasally, then discard. Repeat with new spray every 2 min as needed for opioid overdose symptoms, alternating nostrils. Indications: decrease in rate & depth of breathing due to opioid drug, opioid overdose  Qty: 1 Each, Refills: 0         CONTINUE these medications which have NOT CHANGED    Details   glimepiride (AmaryL) 1 mg tablet Take 1 mg by mouth two (2) times a day. pantoprazole (Protonix) 40 mg tablet Take 40 mg by mouth daily. diclofenac (VOLTAREN) 1 % gel       ferrous sulfate (SLOW FE) 142 mg (45 mg iron) ER tablet Take 142 mg by mouth every other day. lisinopril (PRINIVIL, ZESTRIL) 5 mg tablet Take 5 mg by mouth daily. calcium-cholecalciferol, d3, (CALCIUM 600 + D) 600-125 mg-unit tab Take  by mouth. Indications: TWICE DAILY      leflunomide (ARAVA) 20 mg tablet Take 20 mg by mouth daily. TOFACITINIB CITRATE (XELJANZ PO) Take 5 mg by mouth two (2) times a day. Indications: TWICE DAILY             Follow-up Information     Follow up With Specialties Details Why Contact Info    Jen Vila MD Orthopedic Surgery Schedule an appointment as soon as possible for a visit in 1 month  [address]  [phone]              PCP:  Katina Patterson MD        Disposition:  Clear for discharge, if clear by medicine service. Follow-up in the office in 1 month with Dr. Hao Bazzi; call [phone] to schedule appointment. Wound Care: May remove the dressing on 4-22-20 and shower if no drainage    DVT prophylaxis:  Frequent mobilization and TEDs    Weightbearing Status: Spine precautions.  Use walker and TLSO when out of bed.         -Vilma Das PA-C  4/21/2020  Office: [phone]

## 2020-04-21 NOTE — PROGRESS NOTES
Discharge instructions provided to pt on behalf of primary nurse [facility]. Peripheral iv removed. Pt dressed. Plan for medical transport to [facility]. ETA 1630.

## 2020-04-22 NOTE — PROGRESS NOTES
4/22/2020 0755    UAI denied in EPAS after 1st attempt. Re-submitted successfully in EPAS.  Form [id number]  Plan is SNF for short- term rehab  Has not applied for Medicaid

## 2020-05-09 NOTE — PROGRESS NOTES
5/9/2020 Follow-up regarding UAI/LTSS    UAI successfully processed. Pt was screened for Nursing Facility and not approved. Note error in address on processed UAI. Submitted request to DMAS to VOID so correction can be made and re-submit.      Liss Rodgers RN    Outcomes Manager  [phone]  [phone]

## (undated) DEVICE — TOWEL SURG W16XL26IN BLU NONFENESTRATED DLX ST 2 PER PK

## (undated) DEVICE — SPINE PACK DEPAUL: Brand: MEDLINE INDUSTRIES, INC.

## (undated) DEVICE — THE MILL DISPOSABLE - FINE

## (undated) DEVICE — NDL PRT INJ NSAF BLNT 18GX1.5 --

## (undated) DEVICE — SUTURE ABSORBABLE BRAIDED 2-0 CT-1 27 IN UD VICRYL J259H

## (undated) DEVICE — SUTURE COAT VCRL SZ 0 L18IN ABSRB UD CTX L48MM 1/2 CIR J724D

## (undated) DEVICE — PAD,ABDOMINAL,5"X9",STERILE,LF,1/PK: Brand: MEDLINE INDUSTRIES, INC.

## (undated) DEVICE — SHEET,DRAPE,70X100,STERILE: Brand: MEDLINE

## (undated) DEVICE — BIPOLAR FORCEPS CORD: Brand: VALLEYLAB

## (undated) DEVICE — STERILE LATEX POWDER-FREE SURGICAL GLOVESWITH NITRILE COATING: Brand: PROTEXIS

## (undated) DEVICE — 3M™ IOBAN™ 2 ANTIMICROBIAL INCISE DRAPE 6650EZ: Brand: IOBAN™ 2

## (undated) DEVICE — BRUSH SCRB PCMX NL CLN 12ML --

## (undated) DEVICE — SPONGE GZ W4XL4IN COT 12 PLY TYP VII WVN C FLD DSGN

## (undated) DEVICE — INTENDED FOR TISSUE SEPARATION, AND OTHER PROCEDURES THAT REQUIRE A SHARP SURGICAL BLADE TO PUNCTURE OR CUT.: Brand: BARD-PARKER ® CARBON RIB-BACK BLADES

## (undated) DEVICE — PAD,ABDOMINAL,5"X9",ST,LF,25/BX: Brand: MEDLINE INDUSTRIES, INC.

## (undated) DEVICE — BLADE ELECTRODE: Brand: EDGE

## (undated) DEVICE — TRAY CATH 16F URIN MTR LTX -- CONVERT TO ITEM 363111

## (undated) DEVICE — DRAPE C-ARMOUR C-ARM KIT --

## (undated) DEVICE — KENDALL SCD EXPRESS SLEEVES, KNEE LENGTH, MEDIUM: Brand: KENDALL SCD

## (undated) DEVICE — X-RAY SPONGES,12 PLY: Brand: DERMACEA

## (undated) DEVICE — PACKING 8004050 NEURAY 200PK 7X152MM: Brand: NEURAY ®

## (undated) DEVICE — SUTURE COAT VCRL PC 5 PRECIS COSM CONVENTIONAL CUT PRIM 3 8 J823H

## (undated) DEVICE — SOL IRRIGATION INJ NACL 0.9% 500ML BTL

## (undated) DEVICE — 12FR FRAZIER SUCTION HANDLE: Brand: CARDINAL HEALTH

## (undated) DEVICE — ELECTRODE BLDE L4IN NONINSULATED EDGE

## (undated) DEVICE — PAD ALCOHOL PREP LG --

## (undated) DEVICE — SYR LR LCK 1ML GRAD NSAF 30ML --

## (undated) DEVICE — TOOL 14MH30 LEGEND 14CM 3MM: Brand: MIDAS REX ™

## (undated) DEVICE — GAUZE,SPONGE,8"X4",12PLY,XRAY,STRL,LF: Brand: MEDLINE

## (undated) DEVICE — PREP SKN DURAPREP 26ML APPL --

## (undated) DEVICE — CODMAN® SURGICAL STRIPS 1/2" X 6" (13MM X 152MM): Brand: CODMAN®

## (undated) DEVICE — REM POLYHESIVE ADULT PATIENT RETURN ELECTRODE: Brand: VALLEYLAB

## (undated) DEVICE — SUTURE VCRL SZ 4-0 L27IN ABSRB UD L19MM PS-2 3/8 CIR PRIM J426H

## (undated) DEVICE — MEDIUM GRAFT KIT

## (undated) DEVICE — AGENT HEMSTAT 8ML FLX TIP MTRX + DISP SURGIFLO

## (undated) DEVICE — SYRINGE IRRIG 60ML SFT PLIABLE BLB EZ TO GRP 1 HND USE W/

## (undated) DEVICE — SUTURE VCRL SZ 0 L36IN ABSRB UD L48MM CTX 1/2 CIR J978H

## (undated) DEVICE — SUTURE COAT VCRL SZ 4-0 L18IN ABSRB UD L19MM PS-2 1/2 CIR J496G

## (undated) DEVICE — MAYO STAND COVER: Brand: CONVERTORS

## (undated) DEVICE — PACKING 8004051 NEURAY 200PK 13X152MM: Brand: NEURAY ®